# Patient Record
Sex: MALE
[De-identification: names, ages, dates, MRNs, and addresses within clinical notes are randomized per-mention and may not be internally consistent; named-entity substitution may affect disease eponyms.]

---

## 2018-09-07 ENCOUNTER — HOSPITAL (OUTPATIENT)
Dept: OTHER | Age: 73
End: 2018-09-07
Attending: EMERGENCY MEDICINE

## 2018-09-07 LAB
ANALYZER ANC (IANC): ABNORMAL
ANION GAP SERPL CALC-SCNC: 14 MMOL/L (ref 10–20)
APTT PPP: 25 SECONDS (ref 22–30)
APTT PPP: NORMAL S
BASOPHILS # BLD: 0 THOUSAND/MCL (ref 0–0.3)
BASOPHILS NFR BLD: 0 %
BUN SERPL-MCNC: 17 MG/DL (ref 6–20)
BUN/CREAT SERPL: 18 (ref 7–25)
CALCIUM SERPL-MCNC: 8.6 MG/DL (ref 8.4–10.2)
CHLORIDE: 108 MMOL/L (ref 98–107)
CO2 SERPL-SCNC: 24 MMOL/L (ref 21–32)
CREAT SERPL-MCNC: 0.92 MG/DL (ref 0.67–1.17)
DIFFERENTIAL METHOD BLD: ABNORMAL
EOSINOPHIL # BLD: 0.1 THOUSAND/MCL (ref 0.1–0.5)
EOSINOPHIL NFR BLD: 1 %
ERYTHROCYTE [DISTWIDTH] IN BLOOD: 12.4 % (ref 11–15)
GLUCOSE SERPL-MCNC: 115 MG/DL (ref 65–99)
HEMATOCRIT: 41.5 % (ref 39–51)
HGB BLD-MCNC: 14 GM/DL (ref 13–17)
INR PPP: 1.1
LYMPHOCYTES # BLD: 1 THOUSAND/MCL (ref 1–4)
LYMPHOCYTES NFR BLD: 14 %
MCH RBC QN AUTO: 33 PG (ref 26–34)
MCHC RBC AUTO-ENTMCNC: 33.7 GM/DL (ref 32–36.5)
MCV RBC AUTO: 97.9 FL (ref 78–100)
MONOCYTES # BLD: 0.4 THOUSAND/MCL (ref 0.3–0.9)
MONOCYTES NFR BLD: 6 %
NEUTROPHILS # BLD: 6.1 THOUSAND/MCL (ref 1.8–7.7)
NEUTROPHILS NFR BLD: 79 %
NEUTS SEG NFR BLD: ABNORMAL %
NRBC (NRBCRE): ABNORMAL
PLATELET # BLD: 188 THOUSAND/MCL (ref 140–450)
POTASSIUM SERPL-SCNC: 3.9 MMOL/L (ref 3.4–5.1)
PROTHROMBIN TIME: 10.7 SECONDS (ref 9.7–11.8)
PROTHROMBIN TIME: NORMAL
RBC # BLD: 4.24 MILLION/MCL (ref 4.5–5.9)
SODIUM SERPL-SCNC: 142 MMOL/L (ref 135–145)
WBC # BLD: 7.6 THOUSAND/MCL (ref 4.2–11)

## 2022-03-23 ENCOUNTER — HOSPITAL ENCOUNTER (INPATIENT)
Facility: HOSPITAL | Age: 77
LOS: 1 days | Discharge: NURSING, REHAB OR LONG-TERM CARE | End: 2022-03-25
Attending: EMERGENCY MEDICINE | Admitting: HOSPITALIST
Payer: MEDICARE

## 2022-03-23 DIAGNOSIS — N17.9 ACUTE RENAL FAILURE, UNSPECIFIED ACUTE RENAL FAILURE TYPE (HCC): ICD-10-CM

## 2022-03-23 DIAGNOSIS — D72.829 LEUKOCYTOSIS, UNSPECIFIED TYPE: ICD-10-CM

## 2022-03-23 DIAGNOSIS — R33.9 URINARY RETENTION: Primary | ICD-10-CM

## 2022-03-23 DIAGNOSIS — N19 UREMIA: ICD-10-CM

## 2022-03-23 RX ORDER — POLYETHYLENE GLYCOL 3350 17 G/17G
17 POWDER, FOR SOLUTION ORAL DAILY
COMMUNITY

## 2022-03-23 RX ORDER — TAMSULOSIN HYDROCHLORIDE 0.4 MG/1
0.8 CAPSULE ORAL DAILY
COMMUNITY

## 2022-03-23 RX ORDER — QUETIAPINE FUMARATE 25 MG/1
25 TABLET, FILM COATED ORAL NIGHTLY
Status: ON HOLD | COMMUNITY
End: 2022-03-25

## 2022-03-23 RX ORDER — BACITRACIN 500 [USP'U]/G
OINTMENT TOPICAL AS NEEDED
COMMUNITY
End: 2022-03-29

## 2022-03-23 RX ORDER — ALPRAZOLAM 0.25 MG/1
0.25 TABLET ORAL EVERY 12 HOURS PRN
COMMUNITY
Start: 2022-03-26 | End: 2022-04-09

## 2022-03-23 RX ORDER — FINASTERIDE 5 MG/1
5 TABLET, FILM COATED ORAL DAILY
COMMUNITY

## 2022-03-23 RX ORDER — ENOXAPARIN SODIUM 100 MG/ML
40 INJECTION SUBCUTANEOUS DAILY
Status: ON HOLD | COMMUNITY
End: 2022-03-30

## 2022-03-24 ENCOUNTER — APPOINTMENT (OUTPATIENT)
Dept: ULTRASOUND IMAGING | Facility: HOSPITAL | Age: 77
End: 2022-03-24
Attending: EMERGENCY MEDICINE
Payer: MEDICARE

## 2022-03-24 ENCOUNTER — APPOINTMENT (OUTPATIENT)
Dept: GENERAL RADIOLOGY | Facility: HOSPITAL | Age: 77
End: 2022-03-24
Attending: EMERGENCY MEDICINE
Payer: MEDICARE

## 2022-03-24 PROBLEM — R33.9 URINARY RETENTION: Status: ACTIVE | Noted: 2022-03-24

## 2022-03-24 PROBLEM — F03.90 DEMENTIA WITHOUT BEHAVIORAL DISTURBANCE (HCC): Status: ACTIVE | Noted: 2022-03-24

## 2022-03-24 PROBLEM — D72.829 LEUKOCYTOSIS, UNSPECIFIED TYPE: Status: ACTIVE | Noted: 2022-03-24

## 2022-03-24 PROBLEM — N17.9 ACUTE RENAL FAILURE, UNSPECIFIED ACUTE RENAL FAILURE TYPE (HCC): Status: ACTIVE | Noted: 2022-03-24

## 2022-03-24 PROBLEM — N19 UREMIA: Status: ACTIVE | Noted: 2022-03-24

## 2022-03-24 LAB
ALBUMIN SERPL-MCNC: 2.9 G/DL (ref 3.4–5)
ALBUMIN/GLOB SERPL: 0.7 {RATIO} (ref 1–2)
ALP LIVER SERPL-CCNC: 72 U/L
ALT SERPL-CCNC: 16 U/L
ANION GAP SERPL CALC-SCNC: 6 MMOL/L (ref 0–18)
ANION GAP SERPL CALC-SCNC: 8 MMOL/L (ref 0–18)
AST SERPL-CCNC: 13 U/L (ref 15–37)
BASOPHILS # BLD AUTO: 0.02 X10(3) UL (ref 0–0.2)
BASOPHILS # BLD AUTO: 0.03 X10(3) UL (ref 0–0.2)
BASOPHILS NFR BLD AUTO: 0.1 %
BASOPHILS NFR BLD AUTO: 0.2 %
BILIRUB SERPL-MCNC: 0.6 MG/DL (ref 0.1–2)
BILIRUB UR QL STRIP.AUTO: NEGATIVE
BUN BLD-MCNC: 54 MG/DL (ref 7–18)
BUN BLD-MCNC: 63 MG/DL (ref 7–18)
CALCIUM BLD-MCNC: 8.7 MG/DL (ref 8.5–10.1)
CALCIUM BLD-MCNC: 9.1 MG/DL (ref 8.5–10.1)
CHLORIDE SERPL-SCNC: 110 MMOL/L (ref 98–112)
CHLORIDE SERPL-SCNC: 114 MMOL/L (ref 98–112)
CO2 SERPL-SCNC: 22 MMOL/L (ref 21–32)
CO2 SERPL-SCNC: 24 MMOL/L (ref 21–32)
COLOR UR AUTO: YELLOW
CREAT BLD-MCNC: 5.68 MG/DL
CREAT BLD-MCNC: 8.14 MG/DL
CREAT UR-SCNC: 222 MG/DL
EOSINOPHIL # BLD AUTO: 0 X10(3) UL (ref 0–0.7)
EOSINOPHIL # BLD AUTO: 0 X10(3) UL (ref 0–0.7)
EOSINOPHIL NFR BLD AUTO: 0 %
EOSINOPHIL NFR BLD AUTO: 0 %
ERYTHROCYTE [DISTWIDTH] IN BLOOD BY AUTOMATED COUNT: 12.6 %
ERYTHROCYTE [DISTWIDTH] IN BLOOD BY AUTOMATED COUNT: 12.7 %
GLOBULIN PLAS-MCNC: 4 G/DL (ref 2.8–4.4)
GLUCOSE BLD-MCNC: 103 MG/DL (ref 70–99)
GLUCOSE BLD-MCNC: 122 MG/DL (ref 70–99)
GLUCOSE UR STRIP.AUTO-MCNC: NEGATIVE MG/DL
HCT VFR BLD AUTO: 44.4 %
HCT VFR BLD AUTO: 45.5 %
HGB BLD-MCNC: 14.1 G/DL
HGB BLD-MCNC: 15.2 G/DL
HYALINE CASTS #/AREA URNS AUTO: PRESENT /LPF
IMM GRANULOCYTES # BLD AUTO: 0.09 X10(3) UL (ref 0–1)
IMM GRANULOCYTES # BLD AUTO: 0.11 X10(3) UL (ref 0–1)
IMM GRANULOCYTES NFR BLD: 0.6 %
IMM GRANULOCYTES NFR BLD: 0.6 %
KETONES UR STRIP.AUTO-MCNC: NEGATIVE MG/DL
LACTATE SERPL-SCNC: 1 MMOL/L (ref 0.4–2)
LEUKOCYTE ESTERASE UR QL STRIP.AUTO: NEGATIVE
LYMPHOCYTES # BLD AUTO: 0.7 X10(3) UL (ref 1–4)
LYMPHOCYTES # BLD AUTO: 1.24 X10(3) UL (ref 1–4)
LYMPHOCYTES NFR BLD AUTO: 7.9 %
MCH RBC QN AUTO: 32 PG (ref 26–34)
MCH RBC QN AUTO: 32.3 PG (ref 26–34)
MCHC RBC AUTO-ENTMCNC: 31.8 G/DL (ref 31–37)
MCHC RBC AUTO-ENTMCNC: 33.4 G/DL (ref 31–37)
MCV RBC AUTO: 100.9 FL
MCV RBC AUTO: 96.8 FL
MONOCYTES # BLD AUTO: 0.89 X10(3) UL (ref 0.1–1)
MONOCYTES # BLD AUTO: 0.99 X10(3) UL (ref 0.1–1)
MONOCYTES NFR BLD AUTO: 5.3 %
MONOCYTES NFR BLD AUTO: 5.7 %
NEUTROPHILS # BLD AUTO: 13.43 X10 (3) UL (ref 1.5–7.7)
NEUTROPHILS # BLD AUTO: 13.43 X10(3) UL (ref 1.5–7.7)
NEUTROPHILS # BLD AUTO: 16.78 X10 (3) UL (ref 1.5–7.7)
NEUTROPHILS # BLD AUTO: 16.78 X10(3) UL (ref 1.5–7.7)
NEUTROPHILS NFR BLD AUTO: 85.6 %
NEUTROPHILS NFR BLD AUTO: 90.2 %
NITRITE UR QL STRIP.AUTO: NEGATIVE
OSMOLALITY SERPL CALC.SUM OF ELEC: 309 MOSM/KG (ref 275–295)
OSMOLALITY SERPL CALC.SUM OF ELEC: 313 MOSM/KG (ref 275–295)
PH UR STRIP.AUTO: 5 [PH] (ref 5–8)
PLATELET # BLD AUTO: 210 10(3)UL (ref 150–450)
PLATELET # BLD AUTO: 220 10(3)UL (ref 150–450)
POTASSIUM SERPL-SCNC: 4 MMOL/L (ref 3.5–5.1)
POTASSIUM SERPL-SCNC: 4.5 MMOL/L (ref 3.5–5.1)
PROCALCITONIN SERPL-MCNC: 0.19 NG/ML (ref ?–0.16)
PROT SERPL-MCNC: 6.9 G/DL (ref 6.4–8.2)
PROT UR STRIP.AUTO-MCNC: NEGATIVE MG/DL
PSA SERPL-MCNC: 1.92 NG/ML (ref ?–4)
RBC # BLD AUTO: 4.4 X10(6)UL
RBC # BLD AUTO: 4.7 X10(6)UL
RBC #/AREA URNS AUTO: >10 /HPF
SARS-COV-2 RNA RESP QL NAA+PROBE: NOT DETECTED
SODIUM SERPL-SCNC: 140 MMOL/L (ref 136–145)
SODIUM SERPL-SCNC: 144 MMOL/L (ref 136–145)
SODIUM SERPL-SCNC: 7 MMOL/L
UROBILINOGEN UR STRIP.AUTO-MCNC: <2 MG/DL
WBC # BLD AUTO: 15.7 X10(3) UL (ref 4–11)
WBC # BLD AUTO: 18.6 X10(3) UL (ref 4–11)

## 2022-03-24 PROCEDURE — 99222 1ST HOSP IP/OBS MODERATE 55: CPT | Performed by: INTERNAL MEDICINE

## 2022-03-24 PROCEDURE — 76775 US EXAM ABDO BACK WALL LIM: CPT | Performed by: EMERGENCY MEDICINE

## 2022-03-24 PROCEDURE — 71045 X-RAY EXAM CHEST 1 VIEW: CPT | Performed by: EMERGENCY MEDICINE

## 2022-03-24 PROCEDURE — 99222 1ST HOSP IP/OBS MODERATE 55: CPT | Performed by: HOSPITALIST

## 2022-03-24 PROCEDURE — 0T9B80Z DRAINAGE OF BLADDER WITH DRAINAGE DEVICE, VIA NATURAL OR ARTIFICIAL OPENING ENDOSCOPIC: ICD-10-PCS | Performed by: UROLOGY

## 2022-03-24 RX ORDER — SODIUM CHLORIDE 9 MG/ML
INJECTION, SOLUTION INTRAVENOUS CONTINUOUS
Status: DISCONTINUED | OUTPATIENT
Start: 2022-03-24 | End: 2022-03-25

## 2022-03-24 RX ORDER — MELATONIN
6 NIGHTLY
COMMUNITY

## 2022-03-24 RX ORDER — POLYETHYLENE GLYCOL 3350 17 G/17G
17 POWDER, FOR SOLUTION ORAL DAILY
Status: DISCONTINUED | OUTPATIENT
Start: 2022-03-24 | End: 2022-03-25

## 2022-03-24 RX ORDER — TAMSULOSIN HYDROCHLORIDE 0.4 MG/1
0.4 CAPSULE ORAL DAILY
Status: DISCONTINUED | OUTPATIENT
Start: 2022-03-24 | End: 2022-03-25

## 2022-03-24 RX ORDER — QUETIAPINE FUMARATE 25 MG/1
25 TABLET, FILM COATED ORAL NIGHTLY
Status: DISCONTINUED | OUTPATIENT
Start: 2022-03-24 | End: 2022-03-25

## 2022-03-24 RX ORDER — LORAZEPAM 2 MG/ML
1 INJECTION INTRAMUSCULAR ONCE
Status: COMPLETED | OUTPATIENT
Start: 2022-03-24 | End: 2022-03-24

## 2022-03-24 RX ORDER — HEPARIN SODIUM 5000 [USP'U]/ML
5000 INJECTION, SOLUTION INTRAVENOUS; SUBCUTANEOUS EVERY 8 HOURS SCHEDULED
Status: DISCONTINUED | OUTPATIENT
Start: 2022-03-24 | End: 2022-03-25

## 2022-03-24 NOTE — CM/SW NOTE
Pt is a 81year old male who presents with urinary retention. Pt is a LTC resident at [facility]). SW spoke to spouse Paola Hansen and she confirmed that the plan is to return to [address] [place] [address] after dc. Referral sent in Aidin to [facility].      Ang Pepe Work Intern  [phone]

## 2022-03-24 NOTE — ED QUICK NOTES
Chin cath Fr 16 inserted by Dr Jeffrey Huston with a return of 1200 ml dark sandi urine. Pt tolerated well.  Chin patent and continuing to drain

## 2022-03-24 NOTE — ED QUICK NOTES
Orders for admission, patient is aware of plan and ready to go upstairs.  Any questions, please call ED RN, Ronit Wilson at [phone]    Vaccinated? unknown  Type of COVID test sent: Rapid  COVID Suspicion level: Low      Titratable drug(s) infusing: none  Rate:    LOC at time of transport: A/Ox1-2    Other pertinent information:    CIWA score=NA  NIH score=NA

## 2022-03-24 NOTE — OPERATIVE REPORT
3/24/2022    PREOPERATIVE DIAGNOSIS: Urinary retention    POSTOPERATIVE DIAGNOSIS: Same    PROCEDURE PERFORMED: Bedside Flexible Urethroscopy and Placement of Urethral Catheter    ANESTHESIA:  Local.     INDICATIONS: DANNY with urinary retention. Creatinine of 8. On finasteride and tamsulosin. Inability to place rios     FINDINGS: Obstructing lateral lobes of the prostate. I could not get the scope through the prostatic urethra but a guidewire and then a Rios catheter easily passed over it. Suspect he probably just has a very high bladder neck and was sadiq and resisting passage of the scope  TECHNIQUE:  The procedure was done in the patient's hospital bed and room. Lidocaine jelly was injected into the urethra. A time-out was reviewed. The patient was prepped and draped      The flexible cystoscope was passed into the urethra. I did not see any urethral injury. The lateral lobes of the prostate were enlarged and there was coaptation. I could not get the cystoscope to go through the prostatic urethra but a guidewire passed easily and then a 16 [place] catheter easily passed over the guidewire and 1200 cc of yellow urine returned. The bladder was not examined.     Tatyana Talley MD

## 2022-03-24 NOTE — ED INITIAL ASSESSMENT (HPI)
Pt sent from nursing home due to elevated creatinine and wbc, pt with no complaints.  Aa&ox1 per norm, hx of dementia

## 2022-03-25 VITALS
SYSTOLIC BLOOD PRESSURE: 144 MMHG | TEMPERATURE: 98 F | HEART RATE: 63 BPM | RESPIRATION RATE: 18 BRPM | DIASTOLIC BLOOD PRESSURE: 71 MMHG | WEIGHT: 187 LBS | OXYGEN SATURATION: 96 %

## 2022-03-25 LAB
ANION GAP SERPL CALC-SCNC: 3 MMOL/L (ref 0–18)
BUN BLD-MCNC: 26 MG/DL (ref 7–18)
CALCIUM BLD-MCNC: 8.2 MG/DL (ref 8.5–10.1)
CHLORIDE SERPL-SCNC: 117 MMOL/L (ref 98–112)
CO2 SERPL-SCNC: 26 MMOL/L (ref 21–32)
CREAT BLD-MCNC: 1.25 MG/DL
GLUCOSE BLD-MCNC: 80 MG/DL (ref 70–99)
OSMOLALITY SERPL CALC.SUM OF ELEC: 306 MOSM/KG (ref 275–295)
POTASSIUM SERPL-SCNC: 3.9 MMOL/L (ref 3.5–5.1)
SODIUM SERPL-SCNC: 146 MMOL/L (ref 136–145)

## 2022-03-25 PROCEDURE — 99232 SBSQ HOSP IP/OBS MODERATE 35: CPT | Performed by: INTERNAL MEDICINE

## 2022-03-25 PROCEDURE — 99239 HOSP IP/OBS DSCHRG MGMT >30: CPT | Performed by: HOSPITALIST

## 2022-03-25 RX ORDER — QUETIAPINE FUMARATE 25 MG/1
12.5 TABLET, FILM COATED ORAL NIGHTLY
Refills: 0 | Status: SHIPPED | COMMUNITY
Start: 2022-03-25

## 2022-03-25 NOTE — CM/SW NOTE
DC PLAN:  [facility]-[phone]  BLS set up for 5pm     MSW spoke to spouse who is agreeable to dc.

## 2022-03-25 NOTE — CM/SW NOTE
03/25/22 1500   Discharge disposition   Expected discharge disposition Skilled Nurs   [facility] Provider Demetrio Miranda   Discharge transportation [facility]     Patient medically cleared for discharge for return to [facility] transport arranged by  for [employer], PCS form completed. CM sent clinical updates and notified facility of discharge status. CM spoke to patient's spouse with discharge plan update; spouse requested CM contact patient's PCP Dr. Irma Franks at [facility] for Urology follow up referral.  RN to call transfer report to Jose Lawson at [phone] prior to patient discharge.      Amanda Lynn RN Case Manager [id number]

## 2022-03-25 NOTE — PROGRESS NOTES
NURSING DISCHARGE NOTE    Discharged Nursing home via Ambulance. Accompanied by Support staff  Belongings Taken by patient/family. Report given to [facility] [place]. Communicated to RN that patient's PCP at [facility] will sent consult request for urology follow-up to schedule rios catheter exchange in 4 weeks. Discharge paperwork given to support staff. Wife updated by social work regarding discharge. Patient taken by [address] ambulance to [place] in stable condition.

## 2022-03-29 ENCOUNTER — APPOINTMENT (OUTPATIENT)
Dept: CT IMAGING | Facility: HOSPITAL | Age: 77
End: 2022-03-29
Attending: EMERGENCY MEDICINE
Payer: OTHER GOVERNMENT

## 2022-03-29 ENCOUNTER — HOSPITAL ENCOUNTER (OUTPATIENT)
Facility: HOSPITAL | Age: 77
Setting detail: OBSERVATION
LOS: 1 days | Discharge: NURSING, REHAB OR LONG-TERM CARE | End: 2022-03-30
Attending: EMERGENCY MEDICINE | Admitting: INTERNAL MEDICINE
Payer: OTHER GOVERNMENT

## 2022-03-29 ENCOUNTER — APPOINTMENT (OUTPATIENT)
Dept: GENERAL RADIOLOGY | Facility: HOSPITAL | Age: 77
End: 2022-03-29
Attending: EMERGENCY MEDICINE
Payer: OTHER GOVERNMENT

## 2022-03-29 DIAGNOSIS — I62.9 INTRACRANIAL HEMORRHAGE (HCC): Primary | ICD-10-CM

## 2022-03-29 LAB
ALBUMIN SERPL-MCNC: 2.4 G/DL (ref 3.4–5)
ALBUMIN/GLOB SERPL: 0.6 {RATIO} (ref 1–2)
ALP LIVER SERPL-CCNC: 63 U/L
ALT SERPL-CCNC: 18 U/L
ANION GAP SERPL CALC-SCNC: 5 MMOL/L (ref 0–18)
AST SERPL-CCNC: 19 U/L (ref 15–37)
BASOPHILS # BLD AUTO: 0.04 X10(3) UL (ref 0–0.2)
BASOPHILS NFR BLD AUTO: 0.5 %
BILIRUB SERPL-MCNC: 0.3 MG/DL (ref 0.1–2)
BILIRUB UR QL STRIP.AUTO: NEGATIVE
BUN BLD-MCNC: 17 MG/DL (ref 7–18)
CALCIUM BLD-MCNC: 8.4 MG/DL (ref 8.5–10.1)
CHLORIDE SERPL-SCNC: 110 MMOL/L (ref 98–112)
CO2 SERPL-SCNC: 27 MMOL/L (ref 21–32)
COLOR UR AUTO: YELLOW
CREAT BLD-MCNC: 0.86 MG/DL
EOSINOPHIL # BLD AUTO: 0.17 X10(3) UL (ref 0–0.7)
EOSINOPHIL NFR BLD AUTO: 2.2 %
ERYTHROCYTE [DISTWIDTH] IN BLOOD BY AUTOMATED COUNT: 11.7 %
GLOBULIN PLAS-MCNC: 3.8 G/DL (ref 2.8–4.4)
GLUCOSE BLD-MCNC: 97 MG/DL (ref 70–99)
GLUCOSE UR STRIP.AUTO-MCNC: NEGATIVE MG/DL
HCT VFR BLD AUTO: 42.1 %
HGB BLD-MCNC: 14.3 G/DL
IMM GRANULOCYTES # BLD AUTO: 0.04 X10(3) UL (ref 0–1)
IMM GRANULOCYTES NFR BLD: 0.5 %
INR BLD: 1.06 (ref 0.8–1.2)
KETONES UR STRIP.AUTO-MCNC: NEGATIVE MG/DL
LEUKOCYTE ESTERASE UR QL STRIP.AUTO: NEGATIVE
LYMPHOCYTES # BLD AUTO: 1.45 X10(3) UL (ref 1–4)
LYMPHOCYTES NFR BLD AUTO: 18.4 %
MCH RBC QN AUTO: 32.6 PG (ref 26–34)
MCHC RBC AUTO-ENTMCNC: 34 G/DL (ref 31–37)
MCV RBC AUTO: 95.9 FL
MONOCYTES # BLD AUTO: 0.5 X10(3) UL (ref 0.1–1)
MONOCYTES NFR BLD AUTO: 6.3 %
NEUTROPHILS # BLD AUTO: 5.69 X10 (3) UL (ref 1.5–7.7)
NEUTROPHILS # BLD AUTO: 5.69 X10(3) UL (ref 1.5–7.7)
NEUTROPHILS NFR BLD AUTO: 72.1 %
NITRITE UR QL STRIP.AUTO: NEGATIVE
OSMOLALITY SERPL CALC.SUM OF ELEC: 295 MOSM/KG (ref 275–295)
PH UR STRIP.AUTO: 5 [PH] (ref 5–8)
PLATELET # BLD AUTO: 216 10(3)UL (ref 150–450)
POTASSIUM SERPL-SCNC: 3.7 MMOL/L (ref 3.5–5.1)
PROT SERPL-MCNC: 6.2 G/DL (ref 6.4–8.2)
PROT UR STRIP.AUTO-MCNC: 30 MG/DL
PROTHROMBIN TIME: 13.8 SECONDS (ref 11.6–14.8)
RBC # BLD AUTO: 4.39 X10(6)UL
RBC #/AREA URNS AUTO: >10 /HPF
SARS-COV-2 RNA RESP QL NAA+PROBE: NOT DETECTED
SODIUM SERPL-SCNC: 142 MMOL/L (ref 136–145)
SP GR UR STRIP.AUTO: 1.02 (ref 1–1.03)
UROBILINOGEN UR STRIP.AUTO-MCNC: 2 MG/DL
WBC # BLD AUTO: 7.9 X10(3) UL (ref 4–11)

## 2022-03-29 PROCEDURE — 99285 EMERGENCY DEPT VISIT HI MDM: CPT

## 2022-03-29 PROCEDURE — 73502 X-RAY EXAM HIP UNI 2-3 VIEWS: CPT | Performed by: EMERGENCY MEDICINE

## 2022-03-29 PROCEDURE — 85610 PROTHROMBIN TIME: CPT | Performed by: EMERGENCY MEDICINE

## 2022-03-29 PROCEDURE — 80053 COMPREHEN METABOLIC PANEL: CPT | Performed by: EMERGENCY MEDICINE

## 2022-03-29 PROCEDURE — 73700 CT LOWER EXTREMITY W/O DYE: CPT | Performed by: EMERGENCY MEDICINE

## 2022-03-29 PROCEDURE — 76376 3D RENDER W/INTRP POSTPROCES: CPT | Performed by: EMERGENCY MEDICINE

## 2022-03-29 PROCEDURE — 85025 COMPLETE CBC W/AUTO DIFF WBC: CPT | Performed by: EMERGENCY MEDICINE

## 2022-03-29 PROCEDURE — 70450 CT HEAD/BRAIN W/O DYE: CPT | Performed by: EMERGENCY MEDICINE

## 2022-03-29 PROCEDURE — 81001 URINALYSIS AUTO W/SCOPE: CPT | Performed by: EMERGENCY MEDICINE

## 2022-03-29 PROCEDURE — 36415 COLL VENOUS BLD VENIPUNCTURE: CPT

## 2022-03-29 RX ORDER — ALPRAZOLAM 0.25 MG/1
0.25 TABLET ORAL EVERY 12 HOURS PRN
Status: DISCONTINUED | OUTPATIENT
Start: 2022-03-29 | End: 2022-03-30

## 2022-03-29 RX ORDER — MELATONIN
6 NIGHTLY
Status: DISCONTINUED | OUTPATIENT
Start: 2022-03-29 | End: 2022-03-30

## 2022-03-29 RX ORDER — ACETAMINOPHEN 325 MG/1
650 TABLET ORAL EVERY 8 HOURS PRN
Status: DISCONTINUED | OUTPATIENT
Start: 2022-03-29 | End: 2022-03-30

## 2022-03-29 RX ORDER — ONDANSETRON 2 MG/ML
4 INJECTION INTRAMUSCULAR; INTRAVENOUS EVERY 6 HOURS PRN
Status: DISCONTINUED | OUTPATIENT
Start: 2022-03-29 | End: 2022-03-30

## 2022-03-29 RX ORDER — TAMSULOSIN HYDROCHLORIDE 0.4 MG/1
0.8 CAPSULE ORAL DAILY
Status: DISCONTINUED | OUTPATIENT
Start: 2022-03-29 | End: 2022-03-30

## 2022-03-29 RX ORDER — ACETAMINOPHEN 325 MG/1
650 TABLET ORAL EVERY 6 HOURS PRN
Status: DISCONTINUED | OUTPATIENT
Start: 2022-03-29 | End: 2022-03-30

## 2022-03-29 RX ORDER — FINASTERIDE 5 MG/1
5 TABLET, FILM COATED ORAL DAILY
Status: DISCONTINUED | OUTPATIENT
Start: 2022-03-29 | End: 2022-03-30

## 2022-03-29 RX ORDER — AMMONIUM LACTATE 12 G/100G
LOTION TOPICAL DAILY
COMMUNITY

## 2022-03-29 RX ORDER — POLYETHYLENE GLYCOL 3350 17 G/17G
17 POWDER, FOR SOLUTION ORAL DAILY
Status: DISCONTINUED | OUTPATIENT
Start: 2022-03-29 | End: 2022-03-30

## 2022-03-29 RX ORDER — ACETAMINOPHEN 325 MG/1
650 TABLET ORAL EVERY 8 HOURS PRN
COMMUNITY

## 2022-03-29 NOTE — ED QUICK NOTES
Orders for admission, patient is aware of plan and ready to go upstairs. Any questions, please call ED RN Venita Mishra at extension [phone].      Patient Covid vaccination status: Unvaccinated     COVID Test Ordered in ED: None    COVID Suspicion at Admission: N/A    Running Infusions:  None    Mental Status/LOC at time of transport: a&ox1    Other pertinent information:   CIWA score: N/A   NIH score:  N/A

## 2022-03-29 NOTE — CM/SW NOTE
Chart reviewed, pt from [facility] Contracted). Updates sent in Aidin.      Priscilla 106, LSW

## 2022-03-29 NOTE — ED INITIAL ASSESSMENT (HPI)
Patient brought to ER from [facility] with confirmed femur fracture to left leg. Patient A&Ox1 at this time to his normal. Denies head or neck pain.

## 2022-03-30 ENCOUNTER — APPOINTMENT (OUTPATIENT)
Dept: CT IMAGING | Facility: HOSPITAL | Age: 77
End: 2022-03-30
Attending: PHYSICIAN ASSISTANT
Payer: OTHER GOVERNMENT

## 2022-03-30 VITALS
RESPIRATION RATE: 18 BRPM | TEMPERATURE: 97 F | SYSTOLIC BLOOD PRESSURE: 93 MMHG | OXYGEN SATURATION: 95 % | WEIGHT: 140 LBS | DIASTOLIC BLOOD PRESSURE: 56 MMHG | HEART RATE: 71 BPM

## 2022-03-30 PROCEDURE — 70450 CT HEAD/BRAIN W/O DYE: CPT | Performed by: PHYSICIAN ASSISTANT

## 2022-03-30 RX ORDER — ENOXAPARIN SODIUM 100 MG/ML
40 INJECTION SUBCUTANEOUS DAILY
Refills: 0 | Status: SHIPPED | COMMUNITY
Start: 2022-04-06

## 2022-03-30 NOTE — PLAN OF CARE
Pt alert & confused. Calm No complaints . Clear for discharge, [facility] here to  pt. Discharge back to [facility]. Report called to [facility].

## 2022-03-30 NOTE — PLAN OF CARE
Assumed care at 1230. Admitted to unit from ER. Alert and orientated to self. This is baseline prior to admission. NSR on tele, RA, VSS. Patient denies pain. Plan for repeat CT of brain/head tomorrow. Patient/family updated on plan of care. Bed alarm on, fall precautions in place, call light within reach. Monitoring patient needs.

## 2022-03-30 NOTE — CM/SW NOTE
03/30/22 1324   Discharge disposition   Expected discharge disposition Skilled Nurs   [facility] Provider Ryley Morel   Discharge transportation [facility]     Pt cleared for discharge. Spoke with Sadia Renee, pt is ok to return to [facility]. BLS arranged for 4:30pm. PCS form completed. RN updated.      [facility]:   [facility]: [phone]

## 2022-03-30 NOTE — CM/SW NOTE
Patient failed inpatient criteria. Second level of review completed and supports observation. UR committee in agreement. Discussed with Fabian Branch   who approves observation status. MOON given to the patient and order written.

## 2022-03-30 NOTE — PLAN OF CARE
PT ALERT, ORIENTED TO SELF, RESTLESS AT TIMES, PULLED IV OUT PRIOR TO SHIFT, TAKING TELE,  OFF, 94% ON RA, SR, DICKINSON DRAINING YELLOW URINE, REFUSED DINNER, BED ALARM ON, PLAN FOR CT IN AM, GIVEN XANAX, RESTING QUIETLY DURING NIGHT.   0500 - TO CT     Problem: NEUROLOGICAL - ADULT  Goal: Achieves stable or improved neurological status  Description: INTERVENTIONS  - Assess for and report changes in neurological status  - Initiate measures to prevent increased intracranial pressure  - Maintain blood pressure and fluid volume within ordered parameters to optimize cerebral perfusion and minimize risk of hemorrhage  - Monitor temperature, glucose, and sodium.  Initiate appropriate interventions as ordered  Outcome: Progressing

## 2023-04-14 ENCOUNTER — APPOINTMENT (OUTPATIENT)
Dept: CT IMAGING | Facility: HOSPITAL | Age: 78
End: 2023-04-14
Attending: EMERGENCY MEDICINE
Payer: OTHER GOVERNMENT

## 2023-04-14 ENCOUNTER — HOSPITAL ENCOUNTER (EMERGENCY)
Facility: HOSPITAL | Age: 78
Discharge: HOME | End: 2023-04-14
Attending: EMERGENCY MEDICINE
Payer: OTHER GOVERNMENT

## 2023-04-14 VITALS
SYSTOLIC BLOOD PRESSURE: 118 MMHG | RESPIRATION RATE: 16 BRPM | HEART RATE: 63 BPM | DIASTOLIC BLOOD PRESSURE: 70 MMHG | TEMPERATURE: 98 F | OXYGEN SATURATION: 100 %

## 2023-04-14 DIAGNOSIS — N30.00 ACUTE CYSTITIS WITHOUT HEMATURIA: Primary | ICD-10-CM

## 2023-04-14 DIAGNOSIS — R29.6 UNWITNESSED FALL: ICD-10-CM

## 2023-04-14 LAB
ALBUMIN SERPL-MCNC: 2.8 G/DL (ref 3.4–5)
ALBUMIN/GLOB SERPL: 0.9 {RATIO} (ref 1–2)
ALP LIVER SERPL-CCNC: 64 U/L
ALT SERPL-CCNC: 13 U/L
ANION GAP SERPL CALC-SCNC: 3 MMOL/L (ref 0–18)
AST SERPL-CCNC: 11 U/L (ref 15–37)
BASOPHILS # BLD AUTO: 0.04 X10(3) UL (ref 0–0.2)
BASOPHILS NFR BLD AUTO: 0.5 %
BILIRUB SERPL-MCNC: 0.4 MG/DL (ref 0.1–2)
BILIRUB UR QL STRIP.AUTO: NEGATIVE
BUN BLD-MCNC: 30 MG/DL (ref 7–18)
CALCIUM BLD-MCNC: 8.3 MG/DL (ref 8.5–10.1)
CHLORIDE SERPL-SCNC: 116 MMOL/L (ref 98–112)
CK SERPL-CCNC: 30 U/L
CLARITY UR REFRACT.AUTO: CLEAR
CO2 SERPL-SCNC: 28 MMOL/L (ref 21–32)
COLOR UR AUTO: YELLOW
CREAT BLD-MCNC: 0.92 MG/DL
EOSINOPHIL # BLD AUTO: 0.44 X10(3) UL (ref 0–0.7)
EOSINOPHIL NFR BLD AUTO: 5.4 %
ERYTHROCYTE [DISTWIDTH] IN BLOOD BY AUTOMATED COUNT: 12.2 %
GFR SERPLBLD BASED ON 1.73 SQ M-ARVRAT: 86 ML/MIN/1.73M2 (ref 60–?)
GLOBULIN PLAS-MCNC: 3 G/DL (ref 2.8–4.4)
GLUCOSE BLD-MCNC: 89 MG/DL (ref 70–99)
GLUCOSE UR STRIP.AUTO-MCNC: NEGATIVE MG/DL
HCT VFR BLD AUTO: 37.3 %
HGB BLD-MCNC: 12.3 G/DL
IMM GRANULOCYTES # BLD AUTO: 0.02 X10(3) UL (ref 0–1)
IMM GRANULOCYTES NFR BLD: 0.2 %
KETONES UR STRIP.AUTO-MCNC: 20 MG/DL
LACTATE SERPL-SCNC: 1.1 MMOL/L (ref 0.4–2)
LYMPHOCYTES # BLD AUTO: 2 X10(3) UL (ref 1–4)
LYMPHOCYTES NFR BLD AUTO: 24.5 %
MAGNESIUM SERPL-MCNC: 2.2 MG/DL (ref 1.6–2.6)
MCH RBC QN AUTO: 33.4 PG (ref 26–34)
MCHC RBC AUTO-ENTMCNC: 33 G/DL (ref 31–37)
MCV RBC AUTO: 101.4 FL
MONOCYTES # BLD AUTO: 0.64 X10(3) UL (ref 0.1–1)
MONOCYTES NFR BLD AUTO: 7.8 %
NEUTROPHILS # BLD AUTO: 5.02 X10 (3) UL (ref 1.5–7.7)
NEUTROPHILS # BLD AUTO: 5.02 X10(3) UL (ref 1.5–7.7)
NEUTROPHILS NFR BLD AUTO: 61.6 %
NITRITE UR QL STRIP.AUTO: POSITIVE
OSMOLALITY SERPL CALC.SUM OF ELEC: 310 MOSM/KG (ref 275–295)
PH UR STRIP.AUTO: 5 [PH] (ref 5–8)
PLATELET # BLD AUTO: 132 10(3)UL (ref 150–450)
POTASSIUM SERPL-SCNC: 3.2 MMOL/L (ref 3.5–5.1)
PROT SERPL-MCNC: 5.8 G/DL (ref 6.4–8.2)
PROT UR STRIP.AUTO-MCNC: NEGATIVE MG/DL
RBC # BLD AUTO: 3.68 X10(6)UL
RBC UR QL AUTO: NEGATIVE
SODIUM SERPL-SCNC: 147 MMOL/L (ref 136–145)
SP GR UR STRIP.AUTO: 1.03 (ref 1–1.03)
UROBILINOGEN UR STRIP.AUTO-MCNC: 4 MG/DL
WBC # BLD AUTO: 8.2 X10(3) UL (ref 4–11)

## 2023-04-14 PROCEDURE — 36415 COLL VENOUS BLD VENIPUNCTURE: CPT

## 2023-04-14 PROCEDURE — 99285 EMERGENCY DEPT VISIT HI MDM: CPT

## 2023-04-14 PROCEDURE — 80053 COMPREHEN METABOLIC PANEL: CPT | Performed by: EMERGENCY MEDICINE

## 2023-04-14 PROCEDURE — 87186 SC STD MICRODIL/AGAR DIL: CPT | Performed by: EMERGENCY MEDICINE

## 2023-04-14 PROCEDURE — 96365 THER/PROPH/DIAG IV INF INIT: CPT

## 2023-04-14 PROCEDURE — 87088 URINE BACTERIA CULTURE: CPT | Performed by: EMERGENCY MEDICINE

## 2023-04-14 PROCEDURE — 82550 ASSAY OF CK (CPK): CPT | Performed by: EMERGENCY MEDICINE

## 2023-04-14 PROCEDURE — 83605 ASSAY OF LACTIC ACID: CPT | Performed by: EMERGENCY MEDICINE

## 2023-04-14 PROCEDURE — 83735 ASSAY OF MAGNESIUM: CPT | Performed by: EMERGENCY MEDICINE

## 2023-04-14 PROCEDURE — 93010 ELECTROCARDIOGRAM REPORT: CPT

## 2023-04-14 PROCEDURE — 81001 URINALYSIS AUTO W/SCOPE: CPT | Performed by: EMERGENCY MEDICINE

## 2023-04-14 PROCEDURE — 87086 URINE CULTURE/COLONY COUNT: CPT | Performed by: EMERGENCY MEDICINE

## 2023-04-14 PROCEDURE — 85025 COMPLETE CBC W/AUTO DIFF WBC: CPT | Performed by: EMERGENCY MEDICINE

## 2023-04-14 PROCEDURE — 93005 ELECTROCARDIOGRAM TRACING: CPT

## 2023-04-14 PROCEDURE — 87040 BLOOD CULTURE FOR BACTERIA: CPT | Performed by: EMERGENCY MEDICINE

## 2023-04-14 PROCEDURE — 70450 CT HEAD/BRAIN W/O DYE: CPT | Performed by: EMERGENCY MEDICINE

## 2023-04-14 RX ORDER — CEFDINIR 300 MG/1
300 CAPSULE ORAL 2 TIMES DAILY
Qty: 14 CAPSULE | Refills: 0 | Status: SHIPPED | OUTPATIENT
Start: 2023-04-14 | End: 2023-04-21

## 2023-04-14 RX ORDER — POTASSIUM CHLORIDE 1.5 G/1.77G
40 POWDER, FOR SOLUTION ORAL ONCE
Status: COMPLETED | OUTPATIENT
Start: 2023-04-14 | End: 2023-04-14

## 2023-04-14 NOTE — ED INITIAL ASSESSMENT (HPI)
Patient received via [facility] for unwitnessed fall at [facility]. Per [facility], staff told them patient was on floor for unknown amount of time. Patient is not on anti-coagulants but arrive in [facility]. Patient is baseline mentation of alert and oriented x 1, patient has history of dementia. Patient denies pain.

## 2023-04-15 LAB
ATRIAL RATE: 65 BPM
P AXIS: 12 DEGREES
P-R INTERVAL: 142 MS
Q-T INTERVAL: 428 MS
QRS DURATION: 70 MS
QTC CALCULATION (BEZET): 445 MS
R AXIS: 8 DEGREES
T AXIS: 23 DEGREES
VENTRICULAR RATE: 65 BPM

## 2023-04-15 NOTE — ED QUICK NOTES
Spoke with Linda @ [facility], ETA for  going back to [facility] is 90 minutes. Room 237-C.

## 2024-02-13 ENCOUNTER — HOSPITAL ENCOUNTER (INPATIENT)
Facility: HOSPITAL | Age: 79
LOS: 3 days | Discharge: NURSING, REHAB OR LONG-TERM CARE | End: 2024-02-16
Attending: EMERGENCY MEDICINE | Admitting: HOSPITALIST
Payer: MEDICARE

## 2024-02-13 DIAGNOSIS — E86.0 DEHYDRATION: Primary | ICD-10-CM

## 2024-02-13 DIAGNOSIS — E87.0 HYPERNATREMIA: ICD-10-CM

## 2024-02-13 LAB
ALBUMIN SERPL-MCNC: 2.5 G/DL (ref 3.4–5)
ALBUMIN/GLOB SERPL: 0.8 {RATIO} (ref 1–2)
ALP LIVER SERPL-CCNC: 61 U/L
ALT SERPL-CCNC: 20 U/L
ANION GAP SERPL CALC-SCNC: 4 MMOL/L (ref 0–18)
AST SERPL-CCNC: 27 U/L (ref 15–37)
BASOPHILS # BLD AUTO: 0.04 X10(3) UL (ref 0–0.2)
BASOPHILS NFR BLD AUTO: 0.6 %
BILIRUB SERPL-MCNC: 0.9 MG/DL (ref 0.1–2)
BILIRUB UR QL STRIP.AUTO: NEGATIVE
BUN BLD-MCNC: 48 MG/DL (ref 9–23)
CALCIUM BLD-MCNC: 8.4 MG/DL (ref 8.5–10.1)
CHLORIDE SERPL-SCNC: 135 MMOL/L (ref 98–112)
CLARITY UR REFRACT.AUTO: CLEAR
CO2 SERPL-SCNC: 23 MMOL/L (ref 21–32)
COLOR UR AUTO: YELLOW
CREAT BLD-MCNC: 1.28 MG/DL
EGFRCR SERPLBLD CKD-EPI 2021: 58 ML/MIN/1.73M2 (ref 60–?)
EOSINOPHIL # BLD AUTO: 0.02 X10(3) UL (ref 0–0.7)
EOSINOPHIL NFR BLD AUTO: 0.3 %
ERYTHROCYTE [DISTWIDTH] IN BLOOD BY AUTOMATED COUNT: 13.8 %
GLOBULIN PLAS-MCNC: 3.3 G/DL (ref 2.8–4.4)
GLUCOSE BLD-MCNC: 77 MG/DL (ref 70–99)
GLUCOSE BLD-MCNC: 94 MG/DL (ref 70–99)
GLUCOSE UR STRIP.AUTO-MCNC: NORMAL MG/DL
HCT VFR BLD AUTO: 42.3 %
HGB BLD-MCNC: 13.1 G/DL
IMM GRANULOCYTES # BLD AUTO: 0.04 X10(3) UL (ref 0–1)
IMM GRANULOCYTES NFR BLD: 0.6 %
KETONES UR STRIP.AUTO-MCNC: 20 MG/DL
LEUKOCYTE ESTERASE UR QL STRIP.AUTO: NEGATIVE
LYMPHOCYTES # BLD AUTO: 1.84 X10(3) UL (ref 1–4)
LYMPHOCYTES NFR BLD AUTO: 29.1 %
MCH RBC QN AUTO: 33.9 PG (ref 26–34)
MCHC RBC AUTO-ENTMCNC: 31 G/DL (ref 31–37)
MCV RBC AUTO: 109.6 FL
MONOCYTES # BLD AUTO: 0.68 X10(3) UL (ref 0.1–1)
MONOCYTES NFR BLD AUTO: 10.8 %
NEUTROPHILS # BLD AUTO: 3.7 X10 (3) UL (ref 1.5–7.7)
NEUTROPHILS # BLD AUTO: 3.7 X10(3) UL (ref 1.5–7.7)
NEUTROPHILS NFR BLD AUTO: 58.6 %
NITRITE UR QL STRIP.AUTO: NEGATIVE
OSMOLALITY SERPL CALC.SUM OF ELEC: 346 MOSM/KG (ref 275–295)
PH UR STRIP.AUTO: 6 [PH] (ref 5–8)
PLATELET # BLD AUTO: 114 10(3)UL (ref 150–450)
POTASSIUM SERPL-SCNC: 3.8 MMOL/L (ref 3.5–5.1)
PROT SERPL-MCNC: 5.8 G/DL (ref 6.4–8.2)
PROT UR STRIP.AUTO-MCNC: 20 MG/DL
RBC # BLD AUTO: 3.86 X10(6)UL
RBC UR QL AUTO: NEGATIVE
SODIUM SERPL-SCNC: 162 MMOL/L (ref 136–145)
SODIUM SERPL-SCNC: 165 MMOL/L (ref 136–145)
SP GR UR STRIP.AUTO: >1.03 (ref 1–1.03)
UROBILINOGEN UR STRIP.AUTO-MCNC: 8 MG/DL
WBC # BLD AUTO: 6.3 X10(3) UL (ref 4–11)

## 2024-02-13 PROCEDURE — 99222 1ST HOSP IP/OBS MODERATE 55: CPT | Performed by: STUDENT IN AN ORGANIZED HEALTH CARE EDUCATION/TRAINING PROGRAM

## 2024-02-13 RX ORDER — LOPERAMIDE HCL 1 MG/7.5ML
4 SOLUTION ORAL 4 TIMES DAILY PRN
COMMUNITY

## 2024-02-13 RX ORDER — ONDANSETRON 2 MG/ML
4 INJECTION INTRAMUSCULAR; INTRAVENOUS EVERY 6 HOURS PRN
Status: DISCONTINUED | OUTPATIENT
Start: 2024-02-13 | End: 2024-02-16

## 2024-02-13 RX ORDER — DEXTROSE AND SODIUM CHLORIDE 5; .45 G/100ML; G/100ML
INJECTION, SOLUTION INTRAVENOUS CONTINUOUS
Status: DISCONTINUED | OUTPATIENT
Start: 2024-02-13 | End: 2024-02-13

## 2024-02-13 RX ORDER — DEXTROSE MONOHYDRATE 50 MG/ML
INJECTION, SOLUTION INTRAVENOUS CONTINUOUS
Status: DISCONTINUED | OUTPATIENT
Start: 2024-02-13 | End: 2024-02-15

## 2024-02-13 RX ORDER — SENNOSIDES 8.6 MG
17.2 TABLET ORAL NIGHTLY PRN
Status: DISCONTINUED | OUTPATIENT
Start: 2024-02-13 | End: 2024-02-16

## 2024-02-13 RX ORDER — ENEMA 19; 7 G/133ML; G/133ML
1 ENEMA RECTAL ONCE AS NEEDED
Status: DISCONTINUED | OUTPATIENT
Start: 2024-02-13 | End: 2024-02-16

## 2024-02-13 RX ORDER — ACETAMINOPHEN 500 MG
500 TABLET ORAL EVERY 4 HOURS PRN
Status: DISCONTINUED | OUTPATIENT
Start: 2024-02-13 | End: 2024-02-16

## 2024-02-13 RX ORDER — METOCLOPRAMIDE HYDROCHLORIDE 5 MG/ML
10 INJECTION INTRAMUSCULAR; INTRAVENOUS EVERY 8 HOURS PRN
Status: DISCONTINUED | OUTPATIENT
Start: 2024-02-13 | End: 2024-02-15

## 2024-02-13 RX ORDER — BISACODYL 10 MG
10 SUPPOSITORY, RECTAL RECTAL
Status: DISCONTINUED | OUTPATIENT
Start: 2024-02-13 | End: 2024-02-16

## 2024-02-13 RX ORDER — ENOXAPARIN SODIUM 100 MG/ML
40 INJECTION SUBCUTANEOUS DAILY
Status: DISCONTINUED | OUTPATIENT
Start: 2024-02-14 | End: 2024-02-16

## 2024-02-13 RX ORDER — VALPROIC ACID 250 MG/5ML
10 SOLUTION ORAL 3 TIMES DAILY
COMMUNITY

## 2024-02-13 RX ORDER — SODIUM CHLORIDE 9 MG/ML
INJECTION, SOLUTION INTRAVENOUS CONTINUOUS
Status: ACTIVE | OUTPATIENT
Start: 2024-02-13 | End: 2024-02-13

## 2024-02-13 RX ORDER — ONDANSETRON 4 MG/1
4 TABLET, ORALLY DISINTEGRATING ORAL EVERY 6 HOURS PRN
COMMUNITY

## 2024-02-13 RX ORDER — ECHINACEA PURPUREA EXTRACT 125 MG
1 TABLET ORAL
Status: DISCONTINUED | OUTPATIENT
Start: 2024-02-13 | End: 2024-02-16

## 2024-02-13 RX ORDER — HALOPERIDOL 5 MG/ML
0.5 INJECTION INTRAMUSCULAR ONCE
Status: COMPLETED | OUTPATIENT
Start: 2024-02-13 | End: 2024-02-13

## 2024-02-13 RX ORDER — POLYETHYLENE GLYCOL 3350 17 G/17G
17 POWDER, FOR SOLUTION ORAL DAILY PRN
Status: DISCONTINUED | OUTPATIENT
Start: 2024-02-13 | End: 2024-02-16

## 2024-02-13 RX ORDER — SENNA AND DOCUSATE SODIUM 50; 8.6 MG/1; MG/1
1 TABLET, FILM COATED ORAL DAILY
COMMUNITY

## 2024-02-14 LAB
ALBUMIN SERPL-MCNC: 2.2 G/DL (ref 3.4–5)
ALBUMIN/GLOB SERPL: 0.8 {RATIO} (ref 1–2)
ALP LIVER SERPL-CCNC: 51 U/L
ALT SERPL-CCNC: 16 U/L
ANION GAP SERPL CALC-SCNC: 4 MMOL/L (ref 0–18)
AST SERPL-CCNC: 20 U/L (ref 15–37)
BILIRUB SERPL-MCNC: 0.6 MG/DL (ref 0.1–2)
BUN BLD-MCNC: 35 MG/DL (ref 9–23)
CALCIUM BLD-MCNC: 7.5 MG/DL (ref 8.5–10.1)
CHLORIDE SERPL-SCNC: 131 MMOL/L (ref 98–112)
CO2 SERPL-SCNC: 27 MMOL/L (ref 21–32)
CREAT BLD-MCNC: 0.99 MG/DL
EGFRCR SERPLBLD CKD-EPI 2021: 78 ML/MIN/1.73M2 (ref 60–?)
GLOBULIN PLAS-MCNC: 2.8 G/DL (ref 2.8–4.4)
GLUCOSE BLD-MCNC: 114 MG/DL (ref 70–99)
OSMOLALITY SERPL CALC.SUM OF ELEC: 343 MOSM/KG (ref 275–295)
POTASSIUM SERPL-SCNC: 3.1 MMOL/L (ref 3.5–5.1)
PROT SERPL-MCNC: 5 G/DL (ref 6.4–8.2)
SODIUM SERPL-SCNC: 162 MMOL/L (ref 136–145)

## 2024-02-14 PROCEDURE — 99232 SBSQ HOSP IP/OBS MODERATE 35: CPT | Performed by: HOSPITALIST

## 2024-02-14 RX ORDER — TAMSULOSIN HYDROCHLORIDE 0.4 MG/1
0.8 CAPSULE ORAL DAILY
Status: DISCONTINUED | OUTPATIENT
Start: 2024-02-14 | End: 2024-02-16

## 2024-02-14 RX ORDER — FINASTERIDE 5 MG/1
5 TABLET, FILM COATED ORAL DAILY
Status: DISCONTINUED | OUTPATIENT
Start: 2024-02-14 | End: 2024-02-16

## 2024-02-14 NOTE — DIETARY NOTE
[facility]    NUTRITION ASSESSMENT    Pt meets moderate malnutrition criteria at this time.    CRITERIA FOR MALNUTRITION DIAGNOSIS:  Criteria for non-severe malnutrition diagnosis: chronic illness related to energy intake less than75% for greater than 1 month, body fat mild depletion, and muscle mass mild depletion      NUTRITION INTERVENTION:    RD nutrition Care Plan- See RD nutrition assessment for additional recommendations  Meal and Snacks - Monitor and encourage adequate PO intake.   Medical Food Supplements - Magic Cup TID. Rationale/use for oral supplements discussed.  Vitamin and Mineral Supplements - adding Multivitamin with minerals  Coordination of Nutrition Care - SLP consult prior to diet advancement.      PATIENT STATUS: 02/14/24 78/M admitted with dehydration. PMH includes HLD, Dementia,  HTN.  Pt seen for MST.  At time of visit, pt sleeping in bed, does not respond to name being called. Has clavicle, shoulder, thigh and calf wasting noted.  No intake this admission - passed SLP eval for pureed, NTL diet.  NO wt hx available.  Add ONS at all meals to maximize intake      ANTHROPOMETRICS:  Ht: 172.7 cm (5' 7.99\")  Wt: 62.5 kg (137 lb 11.2 oz).   BMI: Body mass index is 20.94 kg/m².  IBW: 70 kg      WEIGHT HISTORY:   Weight loss: NATHANAEL    Wt Readings from Last 10 Encounters:   02/13/24 62.5 kg (137 lb 11.2 oz)   03/29/22 63.5 kg (140 lb)   03/24/22 84.8 kg (187 lb)   01/16/09 127.5 kg (281 lb)   08/27/08 127.5 kg (281 lb)        NUTRITION:  Diet:       Procedures    Sodium restricted diet Sodium Restriction: 2 GM NA; Fluid Consistency: Nectar Thick / Mildly Thick Liquids; Texture Consistency: Pureed; Is Patient on Accuchecks? No      Food Allergies: No  Cultural/Ethnic/Pentecostalism Preferences Addressed: Yes    Percent Meals Eaten (last 3 days)       None            GI system review: WNL Last BM: 2/14  Skin and wounds: WNL    NUTRITION RELATED PHYSICAL FINDINGS:     1. Body Fat/Muscle Mass: moderate  depletion body fat Orbital fat pad and moderate muscle depletion Temple region, Clavicle region, Shoulder/Acromion process, Thigh region, and Calf region     2. Fluid Accumulation: none     NUTRITION PRESCRIPTION: 62.5 kg  Calories: 8225-3497 calories/day (30-35 kcal/kg)  Protein: 75-94 grams protein/day (1.2-1.5 grams protein per kg)  Fluid: ~1 ml/kcal or per MD discretion    NUTRITION DIAGNOSIS/PROBLEM:  Malnutrition related to physiological causes as evidenced by documented/reported insufficient oral intake, loss of fat mass, and loss of muscle mass      MONITOR AND EVALUATE/NUTRITION GOALS:  PO intake of 75% of meals TID - New  PO intake of 75% of oral nutrition supplement/s - New  Weight stable within 1 to 2 lbs during admission - New      MEDICATIONS:  Reviewed    LABS:  Reviewed    Pt is at High nutrition risk      Chyna Galicia RD, LDN, CNSC  Clinical Dietitian  Phone [phone]

## 2024-02-14 NOTE — PROGRESS NOTES
NURSING ADMISSION NOTE      Patient admitted via Cart  Oriented to room.  Safety precautions initiated.  Bed in low position.  Call light in reach.    Assumed care at 2100  Vital signs taken  Tele applied   A&ox0  Rm air  NSR/SB on tele  Incontinent   Denies pain  Bed rest  NPO   Speech eval  D5 infusing at 100 ml/hr  Safety precautions in place  All needs met at this time

## 2024-02-14 NOTE — SLP NOTE
ADULT SWALLOWING EVALUATION    ASSESSMENT    ASSESSMENT/OVERALL IMPRESSION:  Patient is a 77 y/o male admitted with abnormal labs and PMHx significant for HTN, OA, and dementia. SLP order received to evaluate oropharyngeal swallow d/t history of modified diet consistencies. Patient previously unknown to this service. No transfer paperwork from pt's LTC facility available. Patient unable to provide history re: baseline diet, but RN received report of mildly thickened liquids. Attempted to contact caregiver at LTC but was left on hold.    Evaluation limited d/t patient's refusal to participate. PO trials of thin and mildly thick liquids, as well as, pureed solids provided. Patient refused solid PO trials. Bolus acceptance was adequate without evidence of anterior bolus loss. Pharyngeal swallow initiation appeared timely and hyolaryngeal excursion was adequate per palpation.  No overt s/s of aspiration observed and patient denied odynophagia and globus sensation across consistencies.     Recommend patient initiate a pureed diet and mildly thick liquids at this time. SLP will continue to follow to monitor diet tolerance and adjust as appropriate. Education provided re: results and recommendations.         RECOMMENDATIONS   Diet Recommendations - Solids: Puree  Diet Recommendations - Liquids: Nectar thick liquids/ Mildly thick                        Compensatory Strategies Recommended: Small bites and sips  Aspiration Precautions: Upright position  Medication Administration Recommendations: One pill at a time  Treatment Plan/Recommendations: Aspiration precautions;SLP to reassess    HISTORY   MEDICAL HISTORY  Reason for Referral: Altered diet consistency    Problem List  Principal Problem:    Dehydration  Active Problems:    Hypernatremia      Past Medical History  Past Medical History:   Diagnosis Date    Dementia (HCC)     Essential hypertension     HYPERLIPIDEMIA     OBESITY     OSTEOARTHRITIS        Prior Living  Situation: Skilled nursing facility  Diet Prior to Admission: Unknown       Patient/Family Goals: none stated    SWALLOWING HISTORY  Current Diet Consistency: NPO  Dysphagia History: as above  Imaging Results: No recent imaging available    SUBJECTIVE       OBJECTIVE   ORAL MOTOR EXAMINATION  Dentition: Functional  Symmetry: Within Functional Limits  Strength: Within Functional Limits     Range of Motion: Within Functional Limits       Voice Quality: Clear  Respiratory Status: Unlabored  Consistencies Trialed: Thin liquids;Nectar thick liquids;Puree  Method of Presentation: Staff/Clinician assistance  Patient Positioning: Upright;Midline    Oral Phase of Swallow: Within Functional Limits (w/ consistencies trialed)                      Pharyngeal Phase of Swallow: Within Functional Limits (w/ consistencies trialed)           (Please note: Silent aspiration cannot be evaluated clinically. Videofluoroscopic Swallow Study is required to rule-out silent aspiration.)    Esophageal Phase of Swallow: No complaints consistent with possible esophageal involvement  Comments: d/w RN              GOALS  Goal #1 The patient will tolerate puree consistency and mildly thick liquids without overt signs or symptoms of aspiration with 95 % accuracy over 1-2 session(s).  In Progress   Goal #2 SLP to re-assess as appropriate.    In Progress   Goal #3     Goal #4     Goal #5     Goal #6     Goal #7     Goal #8       FOLLOW UP  Treatment Plan/Recommendations: Aspiration precautions;SLP to reassess     Follow Up Needed (Documentation Required): Yes  SLP Follow-up Date: 02/15/24    Thank you for your referral.   If you have any questions, please contact MOLLY Parr

## 2024-02-14 NOTE — ED INITIAL ASSESSMENT (HPI)
PT arrives via EMS from NH. Pt had routine labs drawn today, showing sodium of 161, high creatinine, high chloride and high blood glucose. Pt arrives at baseline mentation, A&O to self. Pt is DNR, comfort measures only.

## 2024-02-14 NOTE — PROGRESS NOTES
[facility]     Hospitalist Progress Note     Tommie Eddy Patient Status:  Inpatient    1945 MRN [id number]   [facility] 3NE-A Attending Tre Chen*   Hosp Day # 1 PCP Lai Lew MD     Chief Complaint: confusion    Subjective:     Patient very confused    Objective:    Review of Systems:   A comprehensive review of systems was completed; pertinent positive and negatives stated in subjective.    Vital signs:  Temp:  [97.4 °F (36.3 °C)-98 °F (36.7 °C)] 98 °F (36.7 °C)  Pulse:  [47-68] 52  Resp:  [12-26] 22  BP: ()/(55-76) 105/66  SpO2:  [96 %-100 %] 96 %    Physical Exam:    General: No acute distress, confused  Respiratory: No wheezes, no rhonchi  Cardiovascular: S1, S2, regular rate and rhythm  Abdomen: Soft, Non-tender, non-distended, positive bowel sounds  Neuro: No new focal deficits.   Extremities: No edema      Diagnostic Data:    Labs:  Recent Labs   Lab 24  1839   WBC 6.3   HGB 13.1   .6*   .0*       Recent Labs   Lab 24  1839 24  2113 24  0658   GLU 94  --  114*   BUN 48*  --  35*   CREATSERUM 1.28  --  0.99   CA 8.4*  --  7.5*   ALB 2.5*  --  2.2*   * 165* 162*   K 3.8  --  3.1*   *  --  131*   CO2 23.0  --  27.0   ALKPHO 61  --  51   AST 27  --  20   ALT 20  --  16   BILT 0.9  --  0.6   TP 5.8*  --  5.0*       Estimated Creatinine Clearance: 55.2 mL/min (based on SCr of 0.99 mg/dL).    No results for input(s): \"TROP\", \"TROPHS\", \"CK\" in the last 168 hours.    No results for input(s): \"PTP\", \"INR\" in the last 168 hours.               Microbiology    No results found for this visit on 24.      Imaging: Reviewed in Epic.    Medications:    tamsulosin  0.8 mg Oral Daily    valproic acid  500 mg Oral TID    finasteride  5 mg Oral Daily    enoxaparin  40 mg Subcutaneous Daily       Assessment & Plan:      #HyperNatremia likely due to dementia/poor oral intake  IVF. Cont d5w  Trend    #Dementia    #Malnutrition  Due to dementia    #Thrombocytopenia  #GOC- need to verify code status   -attempted to call spouse twice, left VM.       Christo Mcclain MD    Supplementary Documentation:     Quality:  DVT Mechanical Prophylaxis:   SCDs,    DVT Pharmacologic Prophylaxis   Medication    enoxaparin (Lovenox) 40 MG/0.4ML SUBQ injection 40 mg                Code Status: Not on file  Chin: No urinary catheter in place  Chin Duration (in days):   Central line:    SHANNON:     Discharge is dependent on: course  At this point Mr. Eddy is expected to be discharge to: home    The 21st Century Cures Act makes medical notes like these available to patients in the interest of transparency. Please be advised this is a medical document. Medical documents are intended to carry relevant information, facts as evident, and the clinical opinion of the practitioner. The medical note is intended as peer to peer communication and may appear blunt or direct. It is written in medical language and may contain abbreviations or verbiage that are unfamiliar.             **Certification      PHYSICIAN Certification of Need for Inpatient Hospitalization - Initial Certification    Patient will require inpatient services that will reasonably be expected to span two midnight's based on the clinical documentation in H+P.   Based on patients current state of illness, I anticipate that, after discharge, patient will require TBD.

## 2024-02-14 NOTE — CM/SW NOTE
02/14/24 0900   CM/SW Referral Data   Referral Source Social Work (self-referral)   Reason for Referral Discharge planning   Informant EMR;Clinical Staff Member   Patient Info   Patient's Home Environment Long Term Care Facility   Post Acute Care Provider Upon Admission [facility]   Discharge Needs   Anticipated D/C needs Long term care facility;Transportation services;To be determined     Pt is a 79 y/o male admitted with dehydration. Chart reviewed for discharge planning needs and noted pt is a LTC resident at [facility]. ALONSO sent referral to [facility] in [facility] and received message from Flaquita at [facility] confirming pt is a LTC resident. [facility] reserved in [facility]. SW will continue to follow.     KASEY Wick  Discharge Planner

## 2024-02-14 NOTE — PLAN OF CARE
Assumed care at 0730  A/Ox self, RA, VSS  Tele, NSR/SB  Denies n/v & pain   K replaced  PIV L wrist, D5 @100mL/hr  SPL recommending, nectar thick/pureed  Incontinent, brief/chucked  Safety measures in place  All needs met at this time    Problem: METABOLIC/FLUID AND ELECTROLYTES - ADULT  Goal: Electrolytes maintained within normal limits  Description: INTERVENTIONS:  - Monitor labs and rhythm and assess patient for signs and symptoms of electrolyte imbalances  - Administer electrolyte replacement as ordered  - Monitor response to electrolyte replacements, including rhythm and repeat lab results as appropriate  - Fluid restriction as ordered  - Instruct patient on fluid and nutrition restrictions as appropriate  Outcome: Progressing  Goal: Hemodynamic stability and optimal renal function maintained  Description: INTERVENTIONS:  - Monitor labs and assess for signs and symptoms of volume excess or deficit  - Monitor intake, output and patient weight  - Monitor urine specific gravity, serum osmolarity and serum sodium as indicated or ordered  - Monitor response to interventions for patient's volume status, including labs, urine output, blood pressure (other measures as available)  - Encourage oral intake as appropriate  - Instruct patient on fluid and nutrition restrictions as appropriate  Outcome: Progressing     Problem: SAFETY ADULT - FALL  Goal: Free from fall injury  Description: INTERVENTIONS:  - Assess pt frequently for physical needs  - Identify cognitive and physical deficits and behaviors that affect risk of falls.  - [place] fall precautions as indicated by assessment.  - Educate pt/family on patient safety including physical limitations  - Instruct pt to call for assistance with activity based on assessment  - Modify environment to reduce risk of injury  - Provide assistive devices as appropriate  - Consider OT/PT consult to assist with strengthening/mobility  - Encourage toileting schedule  Outcome:  Progressing

## 2024-02-14 NOTE — ED QUICK NOTES
Orders for admission, patient is aware of plan and ready to go upstairs. Any questions, please call ED RN kevan at extension [phone].     Patient Covid vaccination status: Unvaccinated     COVID Test Ordered in ED: None    COVID Suspicion at Admission: N/A    Running Infusions:  None    Mental Status/LOC at time of transport: A&O to self/ disoriented    Other pertinent information: yells a lot, can be slightly combative. DNR/ comfort measures  CIWA score: N/A   NIH score:  N/A

## 2024-02-14 NOTE — H&P
[facility]     Tommie Eddy Patient Status:  Emergency    1945 MRN [id number]   Location [facility] EMERGENCY DEPARTMENT Attending Carlos Melgar MD   Hosp Day # 0 PCP Lai Lew MD     Chief Complaint: abnormal labs     Subjective:    History of Present Illness:     Tommie Eddy is a 78 year old male with  h/o dementia, HTN, OA. Pt is presenting with abnormal Na- history limited due to patient factors.    History/Other:    Past Medical History:  Past Medical History:   Diagnosis Date    Dementia (HCC)     Essential hypertension     HYPERLIPIDEMIA     OBESITY     OSTEOARTHRITIS      Past Surgical History:   Past Surgical History:   Procedure Laterality Date    EXPLOR ANKLE JOINT  1986    HERNIA SURGERY      TONSILLECTOMY        Family History:   Family History   Problem Relation Age of Onset    Diabetes Father     Cancer Mother         breast     Social History:    reports that he quit smoking about 38 years ago. He has a 20 pack-year smoking history. He does not have any smokeless tobacco history on file. He reports current alcohol use of about 5.8 standard drinks of alcohol per week. He reports that he does not use drugs.     Allergies:   Allergies   Allergen Reactions    Aricept [Donepezil] UNKNOWN    Latex RASH       Medications:    No current facility-administered medications on file prior to encounter.     Current Outpatient Medications on File Prior to Encounter   Medication Sig Dispense Refill    enoxaparin 40 MG/0.4ML Subcutaneous Solution Inject 0.4 mL (40 mg total) into the skin daily.  0    acetaminophen 325 MG Oral Tab Take 650 mg by mouth every 8 (eight) hours as needed for Pain.      ammonium lactate 12 % External Lotion Apply topically daily.      Cholecalciferol 25 MCG (1000 UT) Oral Tab Take 1,000 Units by mouth daily.      QUEtiapine 25 MG Oral Tab Take 0.5 tablets (12.5 mg total) by mouth nightly.  0    melatonin 3 MG Oral Tab Take 6 mg by  mouth nightly.      polyethylene glycol, PEG 3350, 17 g Oral Powd Pack Take 17 g by mouth daily.      finasteride 5 MG Oral Tab Take 5 mg by mouth daily.      tamsulosin (FLOMAX) cap Take 0.8 mg by mouth daily. 2 tablets daily         Review of Systems:   A comprehensive review of systems could not be completed.    Pertinent positives and negatives noted in the HPI.    Objective:   Physical Exam:    /74   Pulse 63   Temp 98 °F (36.7 °C) (Temporal)   Resp 26   SpO2 100%   General: not alert  Respiratory: No rhonchi, no wheezes  Cardiovascular: S1, S2. Regular rate and rhythm  Abdomen: Soft,  non-distended, positive bowel sounds  Neuro: unable to assess   Extremities: No edema      Results:    Labs:      Labs Last 24 Hours:    Recent Labs   Lab 02/13/24  1839   RBC 3.86   HGB 13.1   HCT 42.3   .6*   MCH 33.9   MCHC 31.0   RDW 13.8   NEPRELIM 3.70   WBC 6.3   .0*       Recent Labs   Lab 02/13/24  1839   GLU 94   BUN 48*   CREATSERUM 1.28   EGFRCR 58*   CA 8.4*   ALB 2.5*   *   K 3.8   *   CO2 23.0   ALKPHO 61   AST 27   ALT 20   BILT 0.9   TP 5.8*       Lab Results   Component Value Date    INR 1.06 03/29/2022       No results for input(s): \"TROP\", \"TROPHS\", \"CK\" in the last 168 hours.    No results for input(s): \"TROP\", \"PBNP\" in the last 168 hours.    No results for input(s): \"PCT\" in the last 168 hours.    Imaging: Imaging data reviewed in Epic.    Assessment & Plan:      #HyperNatremia likely due to dementia/poor oral intake  IVF  Repeat in AM  #Dementia   #Malnutrition  Due to dementia    #Thrombocytopenia  #GOC- need to verify code status     MED rec pending       Plan of care discussed with Dr Ramón Neri MD    Supplementary Documentation:     The 21st Century Cures Act makes medical notes like these available to patients in the interest of transparency. Please be advised this is a medical document. Medical documents are intended to carry relevant information,  facts as evident, and the clinical opinion of the practitioner. The medical note is intended as peer to peer communication and may appear blunt or direct. It is written in medical language and may contain abbreviations or verbiage that are unfamiliar.

## 2024-02-14 NOTE — ED PROVIDER NOTES
Patient Seen in: [facility]      History     Chief Complaint   Patient presents with    Abnormal Labs     Stated Complaint: ab labs    Subjective:   HPI    Patient was sent over for abnormal labs reportedly had a sodium of 161 unclear what led up to the labs or whether this was routine testing.  The patient has dementia and is confused as per baseline.    Objective:   Past Medical History:   Diagnosis Date    Dementia (HCC)     Essential hypertension     HYPERLIPIDEMIA     OBESITY     OSTEOARTHRITIS               Past Surgical History:   Procedure Laterality Date    EXPLOR ANKLE JOINT      HERNIA SURGERY      TONSILLECTOMY                  Social History     Socioeconomic History    Marital status:     Number of children: 0   Occupational History    Occupation:    Tobacco Use    Smoking status: Former     Packs/day: 1.00     Years: 20.00     Additional pack years: 0.00     Total pack years: 20.00     Types: Cigarettes     Quit date: 1986     Years since quittin.1   Substance and Sexual Activity    Alcohol use: Yes     Alcohol/week: 5.8 standard drinks of alcohol     Types: 7 Glasses of wine per week     Comment: 1 wine daily    Drug use: No    Sexual activity: Yes     Partners: Female   Other Topics Concern     Service Yes     Comment: [employer] 3 years    Blood Transfusions No    Exercise Yes     Comment: walk to and from train    Seat Belt Yes    Self-Exams Yes              Review of Systems    Positive for stated complaint: ab labs  Other systems are as noted in HPI.  Constitutional and vital signs reviewed.      All other systems reviewed and negative except as noted above.    Physical Exam     ED Triage Vitals [24 1831]   /74   Pulse 63   Resp 26   Temp 98 °F (36.7 °C)   Temp src Temporal   SpO2 100 %   O2 Device None (Room air)       Current:/75 (BP Location: Left arm)   Pulse 62   Temp 97.4 °F (36.3 °C) (Axillary)   Resp 15   Ht 172.7  cm (5' 7.99\")   Wt 62.5 kg   SpO2 100%   BMI 20.94 kg/m²         Physical Exam    Patient is alert and yelling and screaming intermittently at times will say words but not responding questions appropriately has HEENT exam pupils are equal round react to light oral mucosa is dry neck there is no JVD or lymphadenopathy lungs are clear cardiovascular exam shows regular rate and rhythm without murmurs abdomen is soft and nontender extremities no clubbing sinus edema neurologic exam the patient is confused ANO x 1 but as per baseline motor functions gross intact sensations grossly intact.    ED Course     Labs Reviewed   COMP METABOLIC PANEL (14) - Abnormal; Notable for the following components:       Result Value    Sodium 162 (*)     Chloride 135 (*)     BUN 48 (*)     Calcium, Total 8.4 (*)     Calculated Osmolality 346 (*)     eGFR-Cr 58 (*)     Total Protein 5.8 (*)     Albumin 2.5 (*)     A/G Ratio 0.8 (*)     All other components within normal limits   URINALYSIS WITH CULTURE REFLEX - Abnormal; Notable for the following components:    Spec Gravity >1.030 (*)     Ketones Urine 20 (*)     Protein Urine 20 (*)     Urobilinogen Urine 8 (*)     All other components within normal limits   SODIUM, SERUM - Abnormal; Notable for the following components:    Sodium 165 (*)     All other components within normal limits   CBC W/ DIFFERENTIAL - Abnormal; Notable for the following components:    .0 (*)     .6 (*)     All other components within normal limits   POCT GLUCOSE - Normal   CBC WITH DIFFERENTIAL WITH PLATELET    Narrative:     The following orders were created for panel order CBC With Differential With Platelet.  Procedure                               Abnormality         Status                     ---------                               -----------         ------                     CBC W/ DIFFERENTIAL[[id number]]          Abnormal            Final result                 Please view results for these  tests on the individual orders.   RAINBOW DRAW LAVENDER   RAINBOW DRAW LIGHT GREEN   RAINBOW DRAW BLUE     Abnormal Labs Reviewed   COMP METABOLIC PANEL (14) - Abnormal; Notable for the following components:       Result Value    Sodium 162 (*)     Chloride 135 (*)     BUN 48 (*)     Calcium, Total 8.4 (*)     Calculated Osmolality 346 (*)     eGFR-Cr 58 (*)     Total Protein 5.8 (*)     Albumin 2.5 (*)     A/G Ratio 0.8 (*)     All other components within normal limits   URINALYSIS WITH CULTURE REFLEX - Abnormal; Notable for the following components:    Spec Gravity >1.030 (*)     Ketones Urine 20 (*)     Protein Urine 20 (*)     Urobilinogen Urine 8 (*)     All other components within normal limits   SODIUM, SERUM - Abnormal; Notable for the following components:    Sodium 165 (*)     All other components within normal limits   CBC W/ DIFFERENTIAL - Abnormal; Notable for the following components:    .0 (*)     .6 (*)     All other components within normal limits               No results found.           MDM      Initial differential diagnosis includes dehydration and hypernatremia sepsis    Patient has severe hypernatremic dehydration patient was given IV fluids and admitted for further workup  Admission disposition: 2/13/2024  7:40 PM                                        Medical Decision Making      Disposition and Plan     Clinical Impression:  1. Dehydration    2. Hypernatremia         Disposition:  Admit  2/13/2024  7:40 pm    Follow-up:  No follow-up provider specified.        Medications Prescribed:  Current Discharge Medication List                            Hospital Problems       Present on Admission  Date Reviewed: 3/29/2022            ICD-10-CM Noted POA    * (Principal) Dehydration E86.0 2/13/2024 Unknown    Hypernatremia E87.0 2/13/2024 Unknown

## 2024-02-15 PROBLEM — Z71.89 ADVANCED CARE PLANNING/COUNSELING DISCUSSION: Status: ACTIVE | Noted: 2024-02-15

## 2024-02-15 LAB
ANION GAP SERPL CALC-SCNC: 6 MMOL/L (ref 0–18)
BASOPHILS # BLD AUTO: 0.03 X10(3) UL (ref 0–0.2)
BASOPHILS NFR BLD AUTO: 0.5 %
BUN BLD-MCNC: 22 MG/DL (ref 9–23)
CALCIUM BLD-MCNC: 7.8 MG/DL (ref 8.5–10.1)
CHLORIDE SERPL-SCNC: 124 MMOL/L (ref 98–112)
CO2 SERPL-SCNC: 23 MMOL/L (ref 21–32)
CREAT BLD-MCNC: 0.83 MG/DL
EGFRCR SERPLBLD CKD-EPI 2021: 90 ML/MIN/1.73M2 (ref 60–?)
EOSINOPHIL # BLD AUTO: 0.05 X10(3) UL (ref 0–0.7)
EOSINOPHIL NFR BLD AUTO: 0.9 %
ERYTHROCYTE [DISTWIDTH] IN BLOOD BY AUTOMATED COUNT: 13 %
GLUCOSE BLD-MCNC: 104 MG/DL (ref 70–99)
HCT VFR BLD AUTO: 40.2 %
HGB BLD-MCNC: 13.5 G/DL
IMM GRANULOCYTES # BLD AUTO: 0.06 X10(3) UL (ref 0–1)
IMM GRANULOCYTES NFR BLD: 1.1 %
LYMPHOCYTES # BLD AUTO: 2.02 X10(3) UL (ref 1–4)
LYMPHOCYTES NFR BLD AUTO: 37 %
MAGNESIUM SERPL-MCNC: 2.5 MG/DL (ref 1.6–2.6)
MCH RBC QN AUTO: 34.5 PG (ref 26–34)
MCHC RBC AUTO-ENTMCNC: 33.6 G/DL (ref 31–37)
MCV RBC AUTO: 102.8 FL
MONOCYTES # BLD AUTO: 0.49 X10(3) UL (ref 0.1–1)
MONOCYTES NFR BLD AUTO: 9 %
NEUTROPHILS # BLD AUTO: 2.81 X10 (3) UL (ref 1.5–7.7)
NEUTROPHILS # BLD AUTO: 2.81 X10(3) UL (ref 1.5–7.7)
NEUTROPHILS NFR BLD AUTO: 51.5 %
OSMOLALITY SERPL CALC.SUM OF ELEC: 320 MOSM/KG (ref 275–295)
PHOSPHATE SERPL-MCNC: 1.9 MG/DL (ref 2.5–4.9)
PLATELET # BLD AUTO: 105 10(3)UL (ref 150–450)
POTASSIUM SERPL-SCNC: 2.8 MMOL/L (ref 3.5–5.1)
POTASSIUM SERPL-SCNC: 2.8 MMOL/L (ref 3.5–5.1)
POTASSIUM SERPL-SCNC: 3.2 MMOL/L (ref 3.5–5.1)
RBC # BLD AUTO: 3.91 X10(6)UL
SODIUM SERPL-SCNC: 153 MMOL/L (ref 136–145)
WBC # BLD AUTO: 5.5 X10(3) UL (ref 4–11)

## 2024-02-15 PROCEDURE — 99497 ADVNCD CARE PLAN 30 MIN: CPT | Performed by: HOSPITALIST

## 2024-02-15 PROCEDURE — 99232 SBSQ HOSP IP/OBS MODERATE 35: CPT | Performed by: HOSPITALIST

## 2024-02-15 RX ORDER — HALOPERIDOL 5 MG/ML
5 INJECTION INTRAMUSCULAR EVERY 4 HOURS PRN
Status: DISCONTINUED | OUTPATIENT
Start: 2024-02-15 | End: 2024-02-16

## 2024-02-15 RX ORDER — DEXTROSE MONOHYDRATE, SODIUM CHLORIDE, AND POTASSIUM CHLORIDE 50; .745; 4.5 G/1000ML; G/1000ML; G/1000ML
INJECTION, SOLUTION INTRAVENOUS CONTINUOUS
Status: DISCONTINUED | OUTPATIENT
Start: 2024-02-15 | End: 2024-02-16

## 2024-02-15 NOTE — SLP NOTE
SPEECH DAILY NOTE - INPATIENT    ASSESSMENT & PLAN   ASSESSMENT  Pt seen for dysphagia tx to assess tolerance with recommended diet, ensure proper utilization of aspiration precautions and provide pt/family education.  Patient drowsy, and minimally arousable, in bed. He was initially agreeable to PO trials to assess diet tolerance and ready for advancement. However, he quickly began to refuse further PO intake. No overt s/s of aspiration observed with mildly thick liquids accepted. He refused all solids PO intake. Transfer paperwork from LT facility received and indicated a regular diet with mildly thick liquids at baseline. Recommend patient continue a pureed diet and mildly thick liquids. SLP will continue to follow per POC.    Diet Recommendations - Solids: Puree  Diet Recommendations - Liquids: Nectar thick liquids/ Mildly thick    Compensatory Strategies Recommended: Small bites and sips  Aspiration Precautions: Upright position  Medication Administration Recommendations: One pill at a time    Patient Experiencing Pain: No                Treatment Plan  Treatment Plan/Recommendations: SLP to reassess;Aspiration precautions    Interdisciplinary Communication: Discussed with RN            GOALS  Goal #1 The patient will tolerate puree consistency and mildly thick liquids without overt signs or symptoms of aspiration with 95 % accuracy over 1-2 session(s).  In Progress   Goal #2 SLP to re-assess as appropriate.     In Progress   Goal #3       Goal #4       Goal #5       Goal #6       Goal #7       Goal #8            FOLLOW UP  Follow Up Needed (Documentation Required): Yes  SLP Follow-up Date: 02/16/24       Session: 1    If you have any questions, please contact MOLLY Parr

## 2024-02-15 NOTE — CONSULTS
Palliative Care  Palliative Care Consult request from Dr. Mcclain noted requesting discussion for goals of care. Chart reviewed noting ongoing attempts by SW to obtain information regarding Guardianship/decision maker. Appreciate their assistance. Per notes, [place] ([place]) is advocating to resume hospice care but acknowledges she is not the current legal decision maker. When decision maker identified, if palliative intervention for GOC remain, our service will initiate full consult.     HPI: patient with dementia, HTN, OA from [facility] with abnormal labs/hypernatremia.   POLST form signed by spouse created in 2022 indicating wishes for DNAR/comfort focused treatment, was on hard chart and scanned to EMR.     CELINA Richards  Palliative Care   Phone: [phone]     2/15/2024  1:13 PM    UPDATE:   Dr. Mcclain spoke with guardian Sarah Boswell ([phone]) with [facility]. Both in agreement patient could return to facility and resume hospice care.   Will cancel consult. Please reach out if GOC/POC changes.     CELINA Richards  Palliative Care   Phone: [phone]     2/15/2024  3:21 PM

## 2024-02-15 NOTE — DISCHARGE SUMMARY
[facility]     Tommie Eddy Patient Status:  Inpatient    1945 MRN [id number]   Location [facility] Attending Christo Mcclain MD   Hosp Day # 2 PCP Lai Lew MD     Date of Admission: 2024  Date of Discharge:   24    Discharge Disposition: NH w/ hospice    Discharge Diagnosis:  FTT  Hypernatremia  Dementia  Malnutrition  Thrombocytopenia    History of Present Illness: Tommie Eddy is a 78 year old male with  h/o dementia, HTN, OA. Pt is presenting with abnormal Na- history limited due to patient factors.       Brief Synopsis: pt brought to hospital for high Na. Pt severely dehydrated, refusing to eat. Very confused. Reportedly, pt used to be in hospice, is unclear why he was taken out. D/w wife who states she lost guardianship but agrees pt should be in hospice. Was able to find the state appted guardian who also agrees pt should be hospice. Pt seems to be declining rapidly and is more than appropriate for hospice at this time. Will DC back to NH w/ hospice. The guardian will reach out to the pt's previous hospice company to resume care.     Lace+ Score: 61  59-90 High Risk  29-58 Medium Risk  0-28   Low Risk       TCM Follow-Up Recommendation:  LACE > 58: High Risk of readmission after discharge from the hospital.      Procedures during hospitalization:   none    Incidental or significant findings and recommendations (brief descriptions):  none    Lab/Test results pending at Discharge:   none    Consultants:  palliaitive    Discharge Medication List:     Discharge Medications        CONTINUE taking these medications        Instructions Prescription details   acetaminophen 325 MG Tabs  Commonly known as: Tylenol      Take 2 tablets (650 mg total) by mouth every 4 (four) hours as needed for Pain or Fever.   Refills: 0     enoxaparin 40 MG/0.4ML Sosy  Commonly known as: Lovenox      Inject 0.4 mL (40 mg total) into the skin daily.   Refills: 0      finasteride 5 MG Tabs  Commonly known as: Proscar      Take 1 tablet (5 mg total) by mouth daily.   Refills: 0     loperamide 1 MG/7.5ML Soln  Commonly known as: Imodium      Take 30 mL (4 mg total) by mouth 4 (four) times daily as needed for Diarrhea.   Refills: 0     melatonin 3 MG Tabs      Take 2 tablets (6 mg total) by mouth nightly.   Refills: 0     ondansetron 4 MG Tbdp  Commonly known as: Zofran-ODT      Take 1 tablet (4 mg total) by mouth every 6 (six) hours as needed for Nausea.   Refills: 0     senna-docusate 8.6-50 MG Tabs  Commonly known as: Senokot-S      Take 1 tablet by mouth daily.   Refills: 0     tamsulosin 0.4 MG Caps  Commonly known as: Flomax      Take 2 capsules (0.8 mg total) by mouth daily. 2 tablets daily   Refills: 0     Valproic Acid 250 MG/5ML Soln      Take 10 mL by mouth in the morning and 10 mL at noon and 10 mL in the evening.   Refills: 0              [place]    Follow-up appointment:   No follow-up provider specified.  Appointments for Next 30 Days 2/15/2024 - 3/16/2024      None            Vital signs:  Temp:  [97.1 °F (36.2 °C)-97.8 °F (36.6 °C)] 97.3 °F (36.3 °C)  Pulse:  [50-64] 61  Resp:  [17-20] 18  BP: ()/(62-74) 111/70  SpO2:  [98 %-100 %] 98 %    Physical Exam:    General: No acute distress   Lungs: clear to auscultation  Cardiovascular: S1, S2  Abdomen: Soft      -----------------------------------------------------------------------------------------------  PATIENT DISCHARGE INSTRUCTIONS: See electronic chart    Christo Mcclain MD    Total time spent on discharge plannin minutes     The  Cent[place] Cures Act makes medical notes like these available to patients in the interest of transparency. Please be advised this is a medical document. Medical documents are intended to carry relevant information, facts as evident, and the clinical opinion of the practitioner. The medical note is intended as peer to peer communication and may appear blunt or  direct. It is written in medical language and may contain abbreviations or verbiage that are unfamiliar.

## 2024-02-15 NOTE — PLAN OF CARE
Assumed care at 1930  Alert to self  Room air  NSR/ SB HR in 50s on tele  Non cardiac electrolyte protocol  Lovenox for VTE  2GM NA restriction diet, pureed, nectar thick  Purewick, Chucked & briefed  PIV L upper arm infusing D5 @ 100ml/hr  Posey and wrist restraints in place, renewal @ 1600  Call light within reach, safety precautions in place  All needs met at this time    Problem: METABOLIC/FLUID AND ELECTROLYTES - ADULT  Goal: Electrolytes maintained within normal limits  Description: INTERVENTIONS:  - Monitor labs and rhythm and assess patient for signs and symptoms of electrolyte imbalances  - Administer electrolyte replacement as ordered  - Monitor response to electrolyte replacements, including rhythm and repeat lab results as appropriate  - Fluid restriction as ordered  - Instruct patient on fluid and nutrition restrictions as appropriate  Outcome: Progressing     Problem: METABOLIC/FLUID AND ELECTROLYTES - ADULT  Goal: Hemodynamic stability and optimal renal function maintained  Description: INTERVENTIONS:  - Monitor labs and assess for signs and symptoms of volume excess or deficit  - Monitor intake, output and patient weight  - Monitor urine specific gravity, serum osmolarity and serum sodium as indicated or ordered  - Monitor response to interventions for patient's volume status, including labs, urine output, blood pressure (other measures as available)  - Encourage oral intake as appropriate  - Instruct patient on fluid and nutrition restrictions as appropriate  Outcome: Progressing     Problem: Safety Risk - Non-Violent Restraints  Goal: Patient will remain free from self-harm  Description: INTERVENTIONS:  - Apply the least restrictive restraint to prevent harm  - Notify patient and family of reasons restraints applied  - Assess for any contributing factors to confusion (electrolyte disturbances, delirium, medications)  - Discontinue any unnecessary medical devices as soon as possible  - Assess the  Cardiology Progress Note               Author: Kecia Levy, APRN Date & Time created: 2018  9:05 AM     Interval History:  Pt had been experiencing CP and was transferred from [place], [place] to here. The CP is described as crushing which came and went every 15-20 minutes. She aslo experienced nausea and diaphoresis. Her troponin was 0.1.    The pt has had 4V CABG in  in [place], [place]. Reportedly the pt had an angiogram and the pt had a stroke.     Both legs have been amputated with severe PVD, DM.    Chief Complaint:  Cardiology consultation for abnormal stress test.     Telemetry: SR 60-70s    18: Pt lying in bed in no distress. She reports some intermittent arm pain which radiates down arm and hand and have tingling sensation or cramp while this is happening.     Review of Systems   Constitutional: Negative for chills and fever.   Respiratory: Negative for cough, hemoptysis, sputum production, shortness of breath and wheezing.    Cardiovascular: Negative for chest pain, palpitations, orthopnea, claudication, leg swelling and PND.   Gastrointestinal: Negative for nausea and vomiting.   Neurological: Negative for dizziness and headaches.       Physical Exam   Constitutional: She is oriented to person, place, and time. She appears well-developed and well-nourished.   HENT:   Head: Normocephalic and atraumatic.   Eyes: EOM are normal.   Neck: Neck supple.   Cardiovascular: Normal rate, regular rhythm and normal heart sounds.    No murmur heard.  Pulmonary/Chest: Effort normal. She has rales in the right lower field and the left lower field.   Abdominal: Soft. Bowel sounds are normal.   Musculoskeletal: She exhibits no edema.   Neurological: She is alert and oriented to person, place, and time.   Skin: Skin is warm and dry.   Psychiatric: She has a normal mood and affect. Her behavior is normal. Judgment and thought content normal.       Hemodynamics:  Temp (24hrs), Av.3 °C (97.4 °F), Min:36.1 °C (97  patient's physical comfort, circulation, skin condition, hydration, nutrition and elimination needs   - Reorient and redirection as needed  - Assess for the need to continue restraints  Outcome: Progressing      °F), Max:36.7 °C (98.1 °F)  Temperature: 36.7 °C (98.1 °F)  Pulse  Av.4  Min: 61  Max: 82   Blood Pressure: 125/73     Respiratory:    Respiration: 18, Pulse Oximetry: 92 %  Fluids:     Weight: 55.5 kg (122 lb 5.7 oz)  GI/Nutrition:  Orders Placed This Encounter   Procedures   • Diet Order Diabetic     Standing Status:   Standing     Number of Occurrences:   1     Order Specific Question:   Diet:     Answer:   Diabetic [3]     Lab Results:  Recent Labs      18   0536   WBC  7.1   RBC  4.99   HEMOGLOBIN  13.3   HEMATOCRIT  44.3   MCV  88.8   MCH  26.7*   MCHC  30.0*   RDW  58.4*   PLATELETCT  204   MPV  9.8     Recent Labs      18   0536   SODIUM  134*  132*   POTASSIUM  4.9  4.9   CHLORIDE  101  103   CO2  23  24   GLUCOSE  329*  377*   BUN  19  23*   CREATININE  1.25  1.48*   CALCIUM  7.6*  7.3*         Recent Labs      18   BNPBTYPENAT  607*     Recent Labs      18   2225  18   0536  18   1110   TROPONINI  0.16*  0.17*  0.11*  0.09*   BNPBTYPENAT  607*   --    --    --      Recent Labs      18   0915   TRIGLYCERIDE  157*   HDL  32*   LDL  69     Echocardiography Laboratory 18  CONCLUSIONS  No prior study is available for comparison.   Reduced left ventricular systolic function. Left ventricular ejection fraction is visually estimated to be 35%.   No evidence of valvular abnormality based on Doppler evaluation.     Myocardial Perfusion Report 18  NUCLEAR IMAGING INTERPRETATION  There is a moderate sized infarct of the inferior lateral wall.  There is no significant reversible ischemia.  There is global hypokinesis with  moderate dilatation of the LV cavity.  LV EF is 40%.  ECG INTERPRETATION  Negative stress ECG for ischemia.    Medical Decision Making, by Problem:  Active Hospital Problems    Diagnosis   • *NSTEMI (non-ST elevated myocardial infarction) ([facility]) [I21.4]   • Chronic systolic CHF (congestive heart failure)  ([facility]) [I50.22]   • CAD (coronary artery disease) [I25.10]   • Type 2 diabetes mellitus with hyperglycemia ([facility]) [E11.65]   • Essential hypertension [I10]   • CKD (chronic kidney disease) stage 3, GFR 30-59 ml/min [N18.3]   • Hx of BKA ([facility]) [Z89.519]     Plan:  1. NSTEMI  -Cont ASA 81 mg, atorvastatin 20 mg daily, carvedilol 12.5 mg BID, and Lisinopril 5 mg dialy.    2. Chronic systolic heart failure  -Cont BB, furosemide 20 mg BID, Spironolactone 25 mg daily, and Lisinopril 5 mg daily  -Euvolemic on exam, although pt feels abdomen is swollen  -DC fluids    3. HTN  -Cont lisinopril, furosemide, carvedilol, and spironolactone    Thank you for allowing us to participate in the care of your patient. Will sign off. Please call with any questions.    Quality-Core Measures   Reviewed items::  EKG reviewed, Labs reviewed, Medications reviewed and Radiology images reviewed  Escobar catheter::  No Escobar  DVT prophylaxis pharmacological::  Enoxaparin (Lovenox)

## 2024-02-15 NOTE — PROGRESS NOTES
[facility]     Hospitalist Progress Note     Tommie Eddy Patient Status:  Inpatient    1945 MRN [id number]   [facility] 3NE-A Attending Tre Chen*   Hosp Day # 2 PCP Lai Lew MD     Chief Complaint: confusion    Subjective:     Patient very confused. Restrained, sedated o/n     Objective:    Review of Systems:   A comprehensive review of systems was completed; pertinent positive and negatives stated in subjective.    Vital signs:  Temp:  [97.1 °F (36.2 °C)-98 °F (36.7 °C)] 97.5 °F (36.4 °C)  Pulse:  [50-64] 55  Resp:  [17-22] 17  BP: ()/(62-74) 132/74  SpO2:  [96 %-100 %] 100 %    Physical Exam:    General: No acute distress, confused  Respiratory: No wheezes, no rhonchi  Cardiovascular: S1, S2, regular rate and rhythm  Abdomen: Soft, Non-tender, non-distended, positive bowel sounds  Neuro: No new focal deficits.   Extremities: No edema      Diagnostic Data:    Labs:  Recent Labs   Lab 02/13/24  1839 02/15/24  0823   WBC 6.3 5.5   HGB 13.1 13.5   .6* 102.8*   .0* 105.0*       Recent Labs   Lab 24  2113 24  0658 02/15/24  0617   GLU 94  --  114* 104*   BUN 48*  --  35* 22   CREATSERUM 1.28  --  0.99 0.83   CA 8.4*  --  7.5* 7.8*   ALB 2.5*  --  2.2*  --    * 165* 162* 153*   K 3.8  --  3.1* 2.8*  2.8*   *  --  131* 124*   CO2 23.0  --  27.0 23.0   ALKPHO 61  --  51  --    AST 27  --  20  --    ALT 20  --  16  --    BILT 0.9  --  0.6  --    TP 5.8*  --  5.0*  --        Estimated Creatinine Clearance: 65.9 mL/min (based on SCr of 0.83 mg/dL).    No results for input(s): \"TROP\", \"TROPHS\", \"CK\" in the last 168 hours.    No results for input(s): \"PTP\", \"INR\" in the last 168 hours.               Microbiology    No results found for this visit on 24.      Imaging: Reviewed in Epic.    Medications:    potassium phosphate dibasic 15 mmol in sodium chloride 0.9% 250 mL IVPB  15 mmol Intravenous Once    Followed  by    potassium chloride  40 mEq Intravenous Once    tamsulosin  0.8 mg Oral Daily    valproic acid  500 mg Oral TID    finasteride  5 mg Oral Daily    enoxaparin  40 mg Subcutaneous Daily       Assessment & Plan:      #HyperNatremia likely due to dementia/poor oral intake  #hypokalemia  Correcting. Change to d5 1/2 NS w/ KCl  Trend  Per wife, pt has intermittent periods of eating and not eating    #Dementia   #Malnutrition  Due to dementia    #Thrombocytopenia    #advanced care planning  -had voluntary discussion w/ wife who states she is in poor medical condition herself. States she had to move to [place] to be with family and has not seen the pt in 2 years. States POA was taken away from her by the courts and given to the nursing home. Pt was previously in hospice but was taken out by the NH against her wishes. Went over code status and she agrees pt should be in hospice but that she no longer has the POA to make that decision. There is a POLST for DNR/comfort. I agree, pt should be hospice. D/w SW who will look into who has POA. Consult palliative care as well.  >17 min spent    Christo Mcclain MD    ADDENDUM:  D/w pt's state appt guardian Sarah Boswell. She agreed pt is appropriate for hospice and will contact the pt's previous hospice company. Ok to DC pt back to NH with plans for outpt hospice.    Christo Mcclain MD    Supplementary Documentation:     Quality:  DVT Mechanical Prophylaxis:   SCDs,    DVT Pharmacologic Prophylaxis   Medication    enoxaparin (Lovenox) 40 MG/0.4ML SUBQ injection 40 mg                Code Status: DNAR/Comfort Care  Chin: No urinary catheter in place  Chin Duration (in days):   Central line:    SHANNON:     Discharge is dependent on: course  At this point Mr. Eddy is expected to be discharge to: home    The 21st Century Cures Act makes medical notes like these available to patients in the interest of transparency. Please be advised this is a medical document. Medical documents are  intended to carry relevant information, facts as evident, and the clinical opinion of the practitioner. The medical note is intended as peer to peer communication and may appear blunt or direct. It is written in medical language and may contain abbreviations or verbiage that are unfamiliar.             **Certification      PHYSICIAN Certification of Need for Inpatient Hospitalization - Initial Certification    Patient will require inpatient services that will reasonably be expected to span two midnight's based on the clinical documentation in H+P.   Based on patients current state of illness, I anticipate that, after discharge, patient will require TBD.

## 2024-02-15 NOTE — CDS QUERY
DOCUMENTATION CLARIFICATION FORM  Dear Dr. Mcclain,  Clinical information (provided below) includes documentation of  Malnutrition   PLEASE FURTHER SPECIFY THE DIAGNOSIS OF MALNUTRITION:     [ X   ] Patient meets moderate malnutrition criteria   [    ] Other  (please specify) _________________      Documentation from the Medical Record:   RISK FACTORS:  Dementia - OA - FTT   CLINICAL INDICATORS:   -2/14/24 Dietary Note: NUTRITION ASSESSMENT    Pt meets moderate malnutrition criteria at this time.    CRITERIA FOR MALNUTRITION DIAGNOSIS:  Criteria for non-severe malnutrition diagnosis: chronic illness related to  energy intake less than75% for greater than 1 month, body fat mild depletion,  and muscle mass mild depletion    ANTHROPOMETRICS:  Ht: 172.7 cm (5' 7.99\")  Wt: 62.5 kg (137 lb 11.2 oz).  BMI: Body mass index is 20.94 kg/m².  IBW: 70 kg  -2/15 D/C Summary: Discharge Diagnosis:    FTT  Hypernatremia  Dementia  Malnutrition  Thrombocytopenia    TREATMENT: Dietician consult -  Nutrition education - Medical Food Supplements -        For questions regarding this query, please contact Clinical : Shital Eng -[phone]                                                          THIS FORM IS A PERMANENT PART OF THE MEDICAL RECORD

## 2024-02-15 NOTE — PLAN OF CARE
Assumed care at 0730  A/Oxself, RA, VSS  Tele, NSR/SB  Denies pain   Low sodium, pureed/nectur thick  Critical K, MD aware, replaced per protocol   PIV L upper arm, D5@100mL/hr  Incontinent, brief/chucked   Restraints till 1600  Attempted to contact wife, no response, wasn't given an option to leave voicemail, will attempt to recall  Safety measures in place    Problem: Safety Risk - Non-Violent Restraints  Goal: Patient will remain free from self-harm  Description: INTERVENTIONS:  - Apply the least restrictive restraint to prevent harm  - Notify patient and family of reasons restraints applied  - Assess for any contributing factors to confusion (electrolyte disturbances, delirium, medications)  - Discontinue any unnecessary medical devices as soon as possible  - Assess the patient's physical comfort, circulation, skin condition, hydration, nutrition and elimination needs   - Reorient and redirection as needed  - Assess for the need to continue restraints  Outcome: Progressing     Problem: SAFETY ADULT - FALL  Goal: Free from fall injury  Description: INTERVENTIONS:  - Assess pt frequently for physical needs  - Identify cognitive and physical deficits and behaviors that affect risk of falls.  - [place] fall precautions as indicated by assessment.  - Educate pt/family on patient safety including physical limitations  - Instruct pt to call for assistance with activity based on assessment  - Modify environment to reduce risk of injury  - Provide assistive devices as appropriate  - Consider OT/PT consult to assist with strengthening/mobility  - Encourage toileting schedule  Outcome: Progressing     Problem: METABOLIC/FLUID AND ELECTROLYTES - ADULT  Goal: Electrolytes maintained within normal limits  Description: INTERVENTIONS:  - Monitor labs and rhythm and assess patient for signs and symptoms of electrolyte imbalances  - Administer electrolyte replacement as ordered  - Monitor response to electrolyte replacements,  including rhythm and repeat lab results as appropriate  - Fluid restriction as ordered  - Instruct patient on fluid and nutrition restrictions as appropriate  Outcome: Progressing  Goal: Hemodynamic stability and optimal renal function maintained  Description: INTERVENTIONS:  - Monitor labs and assess for signs and symptoms of volume excess or deficit  - Monitor intake, output and patient weight  - Monitor urine specific gravity, serum osmolarity and serum sodium as indicated or ordered  - Monitor response to interventions for patient's volume status, including labs, urine output, blood pressure (other measures as available)  - Encourage oral intake as appropriate  - Instruct patient on fluid and nutrition restrictions as appropriate  Outcome: Progressing

## 2024-02-15 NOTE — PHYSICAL THERAPY NOTE
PHYSICAL THERAPY EVALUATION - INPATIENT     Room Number: 3610/3610-A  Evaluation Date: 2/15/2024  Type of Evaluation: Initial  Physician Order: PT Eval and Treat    Presenting Problem: abnormal labs, hypernatremia  Co-Morbidities : Dementia, HTN, OA  Reason for Therapy: Mobility Dysfunction and Discharge Planning    PHYSICAL THERAPY ASSESSMENT   Patient is currently functioning below baseline with bed mobility and transfers.  Prior to admission, patient's baseline is indep with supine to sit and sitting EOB, 2 person pivot transfer for bed<>wheelchair, able to propel wc independently.  Patient is requiring dependent as a result of the following impairments: decreased functional strength, decreased endurance/aerobic capacity, pain, impaired sitting balance, decreased muscular endurance, cognitive deficits (dementia), and medical status.  Physical Therapy will continue to follow for duration of hospitalization.    Patient will benefit from continued skilled PT Services to promote return to prior level of function and safety with continuous assistance and gradual rehabilitative therapy . (Trial)    PLAN  PT Treatment Plan: Bed mobility;Body mechanics;Endurance;Energy conservation;Patient education;Family education;Strengthening;Balance training;Transfer training;Neuromuscular re-educate;Range of motion  Rehab Potential : Guarded  Frequency (Obs):  (2-3x/week)  Number of Visits to Meet Established Goals: Trial      CURRENT GOALS    Goal #1 Patient is able to demonstrate supine - sit EOB @ level: minimum assistance     Goal #2 Patient is able to demonstrate transfers EOB to/from Chair/Wheelchair at assistance level: maximum assistance     Goal #3 Patient is able to maintain static sitting balance for 5  minutes with CGA     Goal #4    Goal #5    Goal #6    Goal Comments: Goals established on 2/15/2024      PHYSICAL THERAPY MEDICAL/SOCIAL HISTORY  History related to current admission: Patient is a 78 year old male  admitted on 2024 from [facility] for abnormal labs.  Pt diagnosed with hypernatremia.      HOME SITUATION  Type of Home: Skilled nursing facility ([place])   Home Layout: One level                Lives With: Staff 24 hours  Drives: No          Prior Level of [place]: Pt poor historian, per Judah Mosqueda, pt able to perform supine to sit independently and sit EOB independently. Requires 2 person assist for lateral transfers and assist with adls.     SUBJECTIVE  Pt primarily non verbal during session.       OBJECTIVE  Precautions: Bed/chair alarm  Fall Risk: High fall risk    WEIGHT BEARING RESTRICTION  Weight Bearing Restriction: None                PAIN ASSESSMENT  Ratin          COGNITION  Overall Cognitive Status:  Impaired    RANGE OF MOTION AND STRENGTH ASSESSMENT  See OT note for UE assessment    Lower extremity ROM is as follows:  Right Hip flexion 90-45  Left Hip flexion 90-45  Right Knee 90-45  Left Knee 90-40    Lower extremity strength not tested     BALANCE  Static Sitting: Poor  Dynamic Sitting: Poor  Static Standing: Not tested  Dynamic Standing: Not tested    ADDITIONAL TESTS                                    ACTIVITY TOLERANCE                         O2 WALK       NEUROLOGICAL FINDINGS                        AM-PAC '6-Clicks' INPATIENT SHORT FORM - BASIC MOBILITY  How much difficulty does the patient currently have...  Patient Difficulty: Turning over in bed (including adjusting bedclothes, sheets and blankets)?: Unable   Patient Difficulty: Sitting down on and standing up from a chair with arms (e.g., wheelchair, bedside commode, etc.): Unable   Patient Difficulty: Moving from lying on back to sitting on the side of the bed?: Unable   How much help from another person does the patient currently need...   Help from Another: Moving to and from a bed to a chair (including a wheelchair)?: Total   Help from Another: Need to walk in hospital room?: Total   Help from Another:  Climbing 3-5 steps with a railing?: Total       AM-PAC Score:  Raw Score: 6   Approx Degree of Impairment: 100%   Standardized Score (AM-PAC Scale): 23.55   CMS Modifier (G-Code): CN    FUNCTIONAL ABILITY STATUS  Gait Assessment   Functional Mobility/Gait Assessment  Gait Assistance: Not tested (non ambulatory at baseline)    Skilled Therapy Provided     Bed Mobility:  Rolling: dep  Supine to sit: dep   Sit to supine: dep     Transfer Mobility:  Sit to stand: NT   Stand to sit: NT  Gait = NT    Therapist's comments: RN cleared for session. Pt agreeable for therapy, received supine. Pt transferred from supine to sit with maxA x2, cued pt on reaching towards bed rails to facilitate rolling and supine to sit. Pt with increased stiffness and contractures in BLEs - hips and knees with limited extension. Pt with increased retropulsion, cued on leaning anteriorly. Pt not following commands during session, follows noises 75% of the time (snapping fingers near ears).    Exercise/Education Provided:  Bed mobility  Energy conservation  Functional activity tolerated  Posture  Strengthening    Patient End of Session: In bed;Call light within reach;Needs met;RN aware of session/findings;All patient questions and concerns addressed;Alarm set;Discussed recommendations with /      Patient Evaluation Complexity Level:  History High - 3 or more personal factors and/or co-morbidities   Examination of body systems Moderate - addressing a total of 3 or more elements   Clinical Presentation Moderate - Evolving   Clinical Decision Making Moderate - Evolving       PT Session Time: 20 minutes  Gait Trainin minutes  Therapeutic Activity: 11 minutes

## 2024-02-15 NOTE — CM/SW NOTE
ALONSO received call from Hospitalist regarding POA for pt. Hospitalist stated he spoke with pt's spouse, Krupa, who informed him she was pt's HCPOA, but the HCPOA was 'taken away' from her. Hospitalist stated spouse lives in [place] and has not seen pt in two years.     ALONSO called [facility] liaison Apoorva. Apoorva stated pt has a court appointed guardian named Kiki, who is currently on maternity leave. Apoorva stated pt was on hospice until Fall of 2022 and was taken off hospice since he no longer qualified for hospice services. ALONSO informed Apoorva guardianship contact information and paperwork was not sent with pt on admission, pt only has a spouse and relative listed. ALONSO requested contact for guardian Kiki and guardianship office. Apoorva stated she is waiting for the building to get back to her with the contact information and will provide to ALONSO. ALONSO requested copy of guardianship paperwork to be faxed to the unit at fax: [phone]. Await guardian contact information from [facility].     Addendum (3pm) - ALONSO did not receive faxed guardianship paperwork from [facility]. ALONSO met with Jennifer Oliveros, transitional care coordinator from [facility]. Jennifer stated [facility] stated [facility]'s [facility] at [facility], but when pt was no longer meeting hospice criteria the [facility] pulled funding. Jennifer stated pt's spouse stated she could not assist pt financially and moved to [place]. Jennifer stated pt has been on Family Palliative Care services at the facility and is currently on Medicaid. Jennifer stated she has been unable to contact pt's guardian since she is on maternity leave and has contacted the guardianship office ([phone]) with no answer.     ALONSO called guardianship office ([facility]) [phone] and requested contact information for guardian who is currently following pt's care.  [facility] provided contact information for chidi Boswell ([phone]). Contact information for Sarah Boswell relayed to Hospitalist and Palliative Care APRN. Pt will need to be out of physical/chemical restraints for 24 hours prior to discharge back to [facility]. SW will continue to follow.     Addendum (3:45pm) - SW received call from Jennifre at [facility] stating she received a call from chidi Sarah Boswell. Jennifer stated Sarha is going to sign hospice consents and they will admit pt once he return to the facility. Referral sent to [facility] in [place]. Pt will return to [facility] once he has been out of restraints for 24 hours.     KASEY Wick  Discharge Planner

## 2024-02-15 NOTE — OCCUPATIONAL THERAPY NOTE
OCCUPATIONAL THERAPY EVALUATION - INPATIENT     Room Number: 3610/3610-A  Evaluation Date: 2/15/2024  Type of Evaluation: Initial  Presenting Problem: abnormal labs    Physician Order: IP Consult to Occupational Therapy  Reason for Therapy: ADL/IADL Dysfunction and Discharge Planning    OCCUPATIONAL THERAPY ASSESSMENT   Patient is currently functioning below baseline with eating, bed mobility, transfers, stating sitting balance, and dynamic sitting balance. Prior to admission, patient's baseline is A for all ADLs except self feeding and Mod I for bed mobility and sitting at EOB and Max A x2 to pivot to W/C.  Patient is requiring maximum assist as a result of the following impairments: decreased functional strength, decreased functional reach, decreased endurance, and decreased ability to follow commands . Occupational Therapy will continue to follow for duration of hospitalization.    Patient will benefit from continued skilled OT Services at discharge to promote functional independence and safety with additional support and return home with home health OT at [facility]      History Related to Current Admission: Patient is a 78 year old male admitted on 2/13/2024 with Presenting Problem: abnormal labs. Co-Morbidities : Dementia, HTN, OA    WEIGHT BEARING RESTRICTION  Weight Bearing Restriction: None                Recommendations for nursing staff:   Transfers: total lift  Toileting location: bed level    EVALUATION SESSION:  Patient Start of Session: supine in bed for session  FUNCTIONAL TRANSFER ASSESSMENT  Sit to Stand: Edge of Bed  Edge of Bed: Not Tested (2/2 pt not following commands to sit at EOB)    BED MOBILITY  Rolling: Maximum Assist  Supine to Sit : Maximum Assist  Sit to Supine (OT): Maximum Assist    BALANCE ASSESSMENT  Static Sitting: Moderate Assist    FUNCTIONAL ADL ASSESSMENT  LB Dressing Seated: Maximum Assist (to katelyn socks unable to progress further 2/2 not following commands)      ACTIVITY TOLERANCE:  vitals stable                         O2 SATURATIONS       COGNITION  Overall Cognitive Status:  Impaired and unable to follow commands    Upper Extremity   ROM: within functional limits   Strength: within functional limits   Coordination  Gross motor: WNl  Fine motor: WNL  Sensation: Light touch:  intact    EDUCATION PROVIDED  Patient: Role of Occupational Therapy; Plan of Care  Patient's Response to Education: Demonstrates Poor Carry Over to Information; Requires Further Education    Equipment used: TBD  Demonstrates functional use, Would benefit from additional trial          Patient End of Session: Needs met;In bed;All patient questions and concerns addressed;SCDs in place;Alarm set    OCCUPATIONAL PROFILE    HOME SITUATION  Type of Home: Skilled nursing facility ([place])  Home Layout: One level  Lives With: Staff 24 hours                     Drives: No       Prior Level of Function: Prior to admission, patient's baseline is A for all ADLs except self feeding and Mod I for bed mobility and sitting at EOB and Max A x2 to pivot to W/C.     SUBJECTIVE   Pt stated, \"I am doing well.\"    PAIN ASSESSMENT  Rating: Unable to rate  Location: unknown       OBJECTIVE  Precautions: Bed/chair alarm  Fall Risk: High fall risk      ASSESSMENTS    AM-PAC ‘6-Clicks’ Inpatient Daily Activity Short Form  -   Putting on and taking off regular lower body clothing?: A Lot  -   Bathing (including washing, rinsing, drying)?: A Lot  -   Toileting, which includes using toilet, bedpan or urinal? : A Lot  -   Putting on and taking off regular upper body clothing?: A Lot  -   Taking care of personal grooming such as brushing teeth?: A Lot  -   Eating meals?: A Lot    AM-PAC Score:  Score: 12  Approx Degree of Impairment: 66.57%  Standardized Score (AM-PAC Scale): 30.6    ADDITIONAL TESTS     NEUROLOGICAL FINDINGS      COGNITION ASSESSMENTS       PLAN  OT Treatment Plan: Balance activities;Energy conservation/work simplification  techniques;ADL training;IADL training;Continued evaluation;Compensatory technique education;Equipment eval/education;Patient/Family training;Patient/Family education;UE strengthening/ROM;Endurance training;Functional transfer training  Rehab Potential : Guarded  Frequency: 3x/week  Number of Visits to Meet Established Goals: 3;Trial    ADL Goals   Patient will perform eating: with set up    Functional Transfer Goals  Patient will transfer in bed by rolling:  with min assist  Patient will transfer from sit to supine:  with min assist  Patient will transfer from supine to sit:  with min assist    Patient Evaluation Complexity Level:   Occupational Profile/Medical History MODERATE - Expanded review of history including review of medical or therapy record   Specific performance deficits impacting engagement in ADL/IADL MODERATE  3 - 5 performance deficits   Client Assessment/Performance Deficits MODERATE - Comorbidities and min to mod modifications of tasks    Clinical Decision Making MODERATE - Analysis of occupational profile, detailed assessments, several treatment options    Overall Complexity MODERATE     OT Session Time: 20 minutes  Self-Care Home Management: 0 minutes  Therapeutic Activity: 10 minutes  Neuromuscular Re-education: 0 minutes  Therapeutic Exercise: 0 minutes  Cognitive Skills: 0 minutes  Sensory Integrative: 0 minutes  Orthotic Management and Trainin minutes  Can add/delete any of these

## 2024-02-16 VITALS
OXYGEN SATURATION: 96 % | WEIGHT: 137.69 LBS | SYSTOLIC BLOOD PRESSURE: 141 MMHG | BODY MASS INDEX: 20.87 KG/M2 | DIASTOLIC BLOOD PRESSURE: 62 MMHG | RESPIRATION RATE: 16 BRPM | HEART RATE: 46 BPM | TEMPERATURE: 98 F | HEIGHT: 67.99 IN

## 2024-02-16 LAB
ALBUMIN SERPL-MCNC: 2 G/DL (ref 3.4–5)
ALBUMIN/GLOB SERPL: 0.7 {RATIO} (ref 1–2)
ALP LIVER SERPL-CCNC: 61 U/L
ALT SERPL-CCNC: 17 U/L
ANION GAP SERPL CALC-SCNC: 0 MMOL/L (ref 0–18)
AST SERPL-CCNC: 19 U/L (ref 15–37)
BILIRUB SERPL-MCNC: 0.7 MG/DL (ref 0.1–2)
BUN BLD-MCNC: 16 MG/DL (ref 9–23)
CALCIUM BLD-MCNC: 7.4 MG/DL (ref 8.5–10.1)
CHLORIDE SERPL-SCNC: 121 MMOL/L (ref 98–112)
CO2 SERPL-SCNC: 27 MMOL/L (ref 21–32)
CREAT BLD-MCNC: 0.72 MG/DL
EGFRCR SERPLBLD CKD-EPI 2021: 94 ML/MIN/1.73M2 (ref 60–?)
ERYTHROCYTE [DISTWIDTH] IN BLOOD BY AUTOMATED COUNT: 13.2 %
GLOBULIN PLAS-MCNC: 2.9 G/DL (ref 2.8–4.4)
GLUCOSE BLD-MCNC: 90 MG/DL (ref 70–99)
HCT VFR BLD AUTO: 38.2 %
HGB BLD-MCNC: 12.5 G/DL
MAGNESIUM SERPL-MCNC: 2.2 MG/DL (ref 1.6–2.6)
MCH RBC QN AUTO: 33.9 PG (ref 26–34)
MCHC RBC AUTO-ENTMCNC: 32.7 G/DL (ref 31–37)
MCV RBC AUTO: 103.5 FL
OSMOLALITY SERPL CALC.SUM OF ELEC: 307 MOSM/KG (ref 275–295)
PHOSPHATE SERPL-MCNC: 2.7 MG/DL (ref 2.5–4.9)
PLATELET # BLD AUTO: 93 10(3)UL (ref 150–450)
POTASSIUM SERPL-SCNC: 3.3 MMOL/L (ref 3.5–5.1)
PROT SERPL-MCNC: 4.9 G/DL (ref 6.4–8.2)
RBC # BLD AUTO: 3.69 X10(6)UL
SODIUM SERPL-SCNC: 148 MMOL/L (ref 136–145)
WBC # BLD AUTO: 7 X10(3) UL (ref 4–11)

## 2024-02-16 PROCEDURE — 99239 HOSP IP/OBS DSCHRG MGMT >30: CPT | Performed by: HOSPITALIST

## 2024-02-16 NOTE — PLAN OF CARE
Assumed care at 1930  Alert to self  Room air  NSR/ SB HR in 50s on tele  Non cardiac electrolyte protocol  Lovenox for VTE  2GM NA restriction diet, pureed, nectar thick  Purewick, Chucked & briefed  PIV L upper arm removed by Pt, MD Aware  Call light within reach, safety precautions in place  All needs met at this time    Problem: SAFETY ADULT - FALL  Goal: Free from fall injury  Description: INTERVENTIONS:  - Assess pt frequently for physical needs  - Identify cognitive and physical deficits and behaviors that affect risk of falls.  - [place] fall precautions as indicated by assessment.  - Educate pt/family on patient safety including physical limitations  - Instruct pt to call for assistance with activity based on assessment  - Modify environment to reduce risk of injury  - Provide assistive devices as appropriate  - Consider OT/PT consult to assist with strengthening/mobility  - Encourage toileting schedule  Outcome: Progressing     Problem: METABOLIC/FLUID AND ELECTROLYTES - ADULT  Goal: Electrolytes maintained within normal limits  Description: INTERVENTIONS:  - Monitor labs and rhythm and assess patient for signs and symptoms of electrolyte imbalances  - Administer electrolyte replacement as ordered  - Monitor response to electrolyte replacements, including rhythm and repeat lab results as appropriate  - Fluid restriction as ordered  - Instruct patient on fluid and nutrition restrictions as appropriate  Outcome: Progressing     Problem: METABOLIC/FLUID AND ELECTROLYTES - ADULT  Goal: Hemodynamic stability and optimal renal function maintained  Description: INTERVENTIONS:  - Monitor labs and assess for signs and symptoms of volume excess or deficit  - Monitor intake, output and patient weight  - Monitor urine specific gravity, serum osmolarity and serum sodium as indicated or ordered  - Monitor response to interventions for patient's volume status, including labs, urine output, blood pressure (other  measures as available)  - Encourage oral intake as appropriate  - Instruct patient on fluid and nutrition restrictions as appropriate  Outcome: Progressing

## 2024-02-16 NOTE — CM/SW NOTE
02/16/24 1102   Discharge disposition   Expected discharge disposition [facility]   Post Acute Care Provider Sudeep Juarez   Additional Home Care/Hospice Provider   ([facility])   Discharge transportation [facility]     [facility] arranged for 12pm to [facility]. Updated RN and Sudeep liadominic Guerin. RN to update pt's guardian Sarah Boswell ([phone]). PCS form completed and available for RN to print.      [facility] Phone [phone]    [facility]  [phone]    KASEY Wick  Discharge Planner

## 2024-02-16 NOTE — DISCHARGE INSTRUCTIONS
Diet Recommendations - Solids: Puree  Diet Recommendations - Liquids: Nectar thick liquids/ Mildly thick

## 2024-02-16 NOTE — CM/SW NOTE
Pt discussed in rounds and per RN, pt was placed in restraints for two hours overnight due to pulling at his IV. ALONSO spoke with [employer] Guerin and inquired if pt is still able to return to [facility] today. Pt will be admitted to [facility] at [facility]. Apoorva stated she will speak to the DON at [facility] and reach out to SW.     Addendum (10:50am) - ALONSO received call from Sudeep Guerin inquiring if pt still has an IV. ALONSO spoke with RN who stated pt does not have an IV. Apoorva stated pt is able to return to [facility] today despite not being out of restraints for 24 hours prior to discharge. Apoorva requested a 12pm p/u time for transport. Updated RN.     Jacqueline Perrin, GRACEW  Discharge Planner

## 2024-02-16 NOTE — PROGRESS NOTES
NURSING DISCHARGE NOTE    Discharged Nursing home via Ambulance.  Accompanied by Support staff  Belongings Taken by patient/family.Attempted report to nursing home. No answer.

## (undated) NOTE — IP AVS SNAPSHOT
[address]            (For Outpatient Use Only) Initial Admit Date: 3/23/2022   Inpt/Obs Admit Date: Inpt: 3/24/22 / Obs: N/A   Discharge Date:    Olman Steele:  [de-identified]   MRN: [de-identified]   CSN: [id number]   CEID: [id number]        ENCOUNTER  Patient Class: Inpatient Admitting Provider: Anni Carmona MD Unit: [address] Service: Medical Attending Provider: Hammad Ronquillo DO   Bed: 332-A   Visit Type:   Referring Physician: No ref. provider found Billing Flag:    Admit Diagnosis: Urinary retention [R33.9]      PATIENT  Legal Name:   Ghazal Connell   Legal Sex: Male  Gender ID:              [address] [facility] Name:    PCP:  Claudell Don, MD Home: [phone]   [facility]   [facility] [address]  : 1945 (76 yrs) Mobile: [phone]         City[facility]: [address] [address] Marital:  Language: [address] [place] [address] Drive: Zoomy N4: [id number] Religious: [address]*     Race: White Ethnicity: Non  Or [address] [place] [address]   Name Relationship Legal Guardian? Home Phone Work Phone Mobile Phone   1. Krupa Eddy  2. [facility] Spouse  Relative    [address]   [phone]     GUARANTOR  Guarantor: Fatemeh Warner : 1945 Home Phone: [phone]   [facility]  [facility]  [address]   Sex:  Male Work Phone:    [facility], [address] [place] [address]   Rel. to Patient: Self Guarantor ID: [id number]   [address]   Employer:  Status: RETIRED     COVERAGE  PRIMARY INSURANCE   Payor: MEDICARE Plan: MEDICARE PART A ONLY   Group Number:  Insurance Type: INDEMNITY   Subscriber Name: Елена Poole : 1945   Subscriber ID: [id number] Pt Rel to Subscriber: Self   SECONDARY INSURANCE   Payor: DORI Plan: [facility] OPTUM   Group Number: [id number] Insurance Type: INDEMNITY   Subscriber Name: Fatemeh Warner Subscriber : 1945   Subscriber ID: [id number] Pt Rel to Subscriber: SELF   TERTIARY INSURANCE   Payor:  Plan:    Group Number:  Insurance Type:    Subscriber Name:  Subscriber :    Subscriber ID:  Pt Rel to Subscriber:    Hospital Account Financial Class: Medicare    2022

## (undated) NOTE — IP AVS SNAPSHOT
[address]            (For Outpatient Use Only) Initial Admit Date: 3/29/2022   Inpt/Obs Admit Date: Inpt: 3/29/22 / Obs: 22   Discharge Date:    Hospital Acct:  [de-identified]   MRN: [de-identified]   CSN: [id number]   CEID: [id number]        ENCOUNTER  Patient Class: Observation Admitting Provider: Vaughn Figueroa DO Unit: [address] [facility] Service: Cardiac Telemetry Attending Provider: Vaughn Figueroa DO   Bed: 5737-X   Visit Type:   Referring Physician: No ref. provider found Billing Flag:    Admit Diagnosis: Intracranial hemorrhage [facility]) [I62.9]      PATIENT  Legal Name:   Brittany Spear   Legal Sex: Male  Gender ID:              Pref Name:    PCP:  Maite Terrell MD Home: [phone]   Address:   [facility]  [address]  : 1945 (77 yrs) Mobile: [phone]         City/[facility]: [facility] [place] [facility] [address] Marital:  Language: Chaparro Perezhead: Sheila SSN4: [id number] Yazidi: [address] Not [address]andra*     Race: White Ethnicity: Non  Or [address] [facility] [address]   Name Relationship Legal Guardian? Home Phone Work Phone Mobile Phone   1. Krupa Eddy  2. [facility] Spouse  Relative    [address]   [phone]     GUARANTOR  Guarantor: Lencho Parker : 1945 Home Phone: [phone]   Address:  [facility]  [address]   Sex:  Male Work Phone:    City/[facility], [address] [place] [facility]   Rel. to Patient: Self Guarantor ID: [id number]   [address]   Employer:  Status: RETIRED     COVERAGE  PRIMARY INSURANCE   Payor: [employer] Plan: [address] [place] [address] Road   Group Number: [id number] Insurance Type: [id number] Name: Piper Putnam : 1945   Subscriber ID: [id number] Pt Rel to Subscriber: Self   SECONDARY INSURANCE   Payor: MEDICARE Plan: MEDICARE PART A ONLY   Group Number:  Insurance Type: [id number] Name: Piper Putnam : 1945   Subscriber ID: [id number] Pt Rel to Subscriber: SELF   TERTIARY INSURANCE   Payor:  Plan:    Group Number:  Insurance Type:    Subscriber Name:  Subscriber :    Subscriber ID:  Pt Rel to Subscriber:    Hospital Account Financial Class:     2022

## (undated) NOTE — IP AVS SNAPSHOT
Patient Demographics     Address  [address]  [place] [address] Phone  [phone] (Home)  [phone] (Mobile) *Preferred*      Patient Contacts     Name Relation Home Work Mobile    Krupa Eddy Spouse [phone]    Jero Bustamante Relative [phone]      Allergies as of 2/16/2024  Review status set to In Progress on 2/13/2024       Noted Reaction Type Reactions    Aricept [donepezil] 03/23/2022    UNKNOWN    Latex 08/27/2008    RASH      Code Status Information     Code Status    DNAR/Comfort Care        Patient Instructions       Diet Recommendations - Solids: Puree  Diet Recommendations - Liquids: Nectar thick liquids/ Mildly thick     Follow-up Information     Lai Lew MD. Schedule an appointment as soon as possible for a visit in 1 week(s).    Specialties: Internal Medicine, IP Consult to Primary Care  Contact information:  [address] [id number] [phone]                        Your Home Meds List      TAKE these medications       Instructions Authorizing Provider Morning Afternoon Evening As Needed   acetaminophen 325 MG Tabs  Commonly known as: Tylenol  Next dose due: As directed      Take 2 tablets (650 mg total) by mouth every 4 (four) hours as needed for Pain or Fever.          finasteride 5 MG Tabs  Commonly known as: Proscar  Next dose due: Tomorrow 2/17      Take 1 tablet (5 mg total) by mouth daily.          loperamide 1 MG/7.5ML Soln  Commonly known as: Imodium  Next dose due: As directed      Take 30 mL (4 mg total) by mouth 4 (four) times daily as needed for Diarrhea.          melatonin 3 MG Tabs  Next dose due: Tonight 2/16      Take 2 tablets (6 mg total) by mouth nightly.          ondansetron 4 MG Tbdp  Commonly known as: Zofran-ODT  Next dose due: As directed      Take 1 tablet (4 mg total) by mouth every 6 (six) hours as needed for Nausea.          senna-docusate 8.6-50 MG Tabs  Commonly known as:  Senokot-S  Next dose due: Tomorrow 2/17      Take 1 tablet by mouth daily.          tamsulosin 0.4 MG Caps  Commonly known as: Flomax  Next dose due: Tomorrow 2/17      Take 2 capsules (0.8 mg total) by mouth daily. 2 tablets daily          Valproic Acid 250 MG/5ML Soln  Next dose due: Tonight 2/16      Take 10 mL by mouth in the morning and 10 mL at noon and 10 mL in the evening.                   [id number] - MAR ACTION REPORT  (last 48 hrs)    ** SITE UNKNOWN **     Order ID Medication Name Action Time Action Reason Comments    [id number] finasteride (Proscar) tab 5 mg 02/15/24 0853 Given      [id number] haloperidol lactate (Haldol) 5 MG/ML injection 5 mg 02/15/24 0244 Given      [id number] potassium chloride 10 mEq in dextrose 5%-sodium chloride 0.45% 1000mL infusion premix 02/15/24 1245 New Bag      [id number] potassium phosphate dibasic 15 mmol in sodium chloride 0.9% 250 mL IVPB (Followed by [facility] #1) 02/15/24 0949 New Bag      [id number] tamsulosin (Flomax) cap 0.8 mg 02/15/24 0853 Given      [id number] valproic acid (Depakene) 250 MG/5ML oral solution 500 mg 02/14/24 1700 Given      [id number] valproic acid (Depakene) 250 MG/5ML oral solution 500 mg 02/15/24 0614 Given      [id number] valproic acid (Depakene) 250 MG/5ML oral solution 500 mg 02/15/24 1250 Given      [id number] valproic acid (Depakene) 250 MG/5ML oral solution 500 mg 02/15/24 1750 Given      [id number] valproic acid (Depakene) 250 MG/5ML oral solution 500 mg 02/16/24 0554 Given            LEFT LOWER ABDOMEN     Order ID Medication Name Action Time Action Reason Comments    [id number] enoxaparin (Lovenox) 40 MG/0.4ML SUBQ injection 40 mg 02/16/24 1024 Given            RIGHT LOWER ABDOMEN     Order ID Medication Name Action Time Action Reason Comments    [id number] enoxaparin (Lovenox) 40 MG/0.4ML SUBQ injection 40 mg 02/15/24 0853 Given              Recent Vital Signs    Flowsheet Row Most Recent Value   /62 Filed at 02/16/2024 0800   Pulse  46 Filed at 02/16/2024 0800   Resp 16 Filed at 02/16/2024 0800   Temp 97.7 °F (36.5 °C) Filed at 02/16/2024 0800   SpO2 96 % Filed at 02/16/2024 0800      Patient's Most Recent Weight    Flowsheet Row Most Recent Value   Patient Weight 62.5 kg (137 lb 11.2 oz)         Lab Results Last 24 Hours      Comp Metabolic Panel (14) [[id number]] (Abnormal)  Resulted: 02/16/24 0655, Result status: Final result   Ordering provider: Christo Mcclain MD  02/15/24 2300 Resulting lab: [facility] ([facility])    Specimen Information    Type Source Collected On   Blood — 02/16/24 0607          Components    Component Value Reference Range Flag Lab   Glucose 90 70 - 99 mg/dL — [facility])   Sodium 148 136 - 145 mmol/L [facility])   Potassium 3.3 3.5 - 5.1 mmol/L L [facility])   Chloride 121 98 - 112 mmol/L [facility]   CO2 27.0 21.0 - 32.0 mmol/L — [facility])   Anion Gap 0 0 - 18 mmol/L — [facility])   BUN 16 9 - 23 mg/dL — [facility])   Creatinine 0.72 0.70 - 1.30 mg/dL — [facility]   Calcium, Total 7.4 8.5 - 10.1 mg/dL L [facility])   Calculated Osmolality 307 275 - 295 mOsm/kg H [facility])   eGFR-Cr 94 >=60 mL/min/1.73m2 — [facility])   AST 19 15 - 37 U/L — [facility])   ALT 17 16 - 61 U/L — [facility])   Alkaline Phosphatase 61 45 - 117 U/L — [facility])   Bilirubin, Total 0.7 0.1 - 2.0 mg/dL — [facility])   Total Protein 4.9 6.4 - 8.2 g/dL L [facility])   Albumin 2.0 3.4 - 5.0 g/dL L [facility])   Globulin  2.9 2.8 - 4.4 g/dL — [facility])   A/G Ratio 0.7 1.0 - 2.0 L [facility])            Magnesium [[id number]] (Normal)  Resulted: 02/16/24 0655, Result status: Final result   Ordering provider: Christo Mcclain MD  02/15/24 2308 Resulting lab: [facility] ([facility])    Specimen Information    Type Source Collected On   Blood — 02/16/24 0607          Components    Component Value Reference  Range Flag Lab   Magnesium 2.2 1.6 - 2.6 mg/dL — [facility])            Phosphorus [[id number]] (Normal)  Resulted: 02/16/24 0655, Result status: Final result   Ordering provider: Christo Mcclain MD  02/15/24 2300 Resulting lab: [facility] ([facility])    Specimen Information    Type Source Collected On   Blood — 02/16/24 0607          Components    Component Value Reference Range Flag Lab   Phosphorus 2.7 2.5 - 4.9 mg/dL — [facility])            Scan slide [[id number]]  Resulted: 02/16/24 0650, Result status: Final result   Ordering provider: Christo Mcclain MD  02/16/24 0650 Resulting lab: [facility] ([facility])    Specimen Information    Type Source Collected On   Blood — 02/16/24 0607          Components    Component Value Reference Range Flag Lab   Slide Review Platelets reviewed on smear. No giant platelets or platelet clumps observed. — — [facility])            CBC, Platelet; No Differential [[id number]] (Abnormal)  Resulted: 02/16/24 0650, Result status: Final result   Ordering provider: Christo Mcclain MD  02/15/24 2300 Resulting lab: [facility] ([facility])    Specimen Information    Type Source Collected On   Blood — 02/16/24 0607          Components    Component Value Reference Range Flag Lab   WBC 7.0 4.0 - 11.0 x10(3) uL — [facility])   RBC 3.69 3.80 - 5.80 x10(6)uL L [facility])   HGB 12.5 13.0 - 17.5 g/dL L [facility])   HCT 38.2 39.0 - 53.0 % L [facility])   PLT 93.0 150.0 - 450.0 10(3)uL L [facility])   .5 80.0 - 100.0 fL H [facility])   MCH 33.9 26.0 - 34.0 pg — [facility])   MCHC 32.7 31.0 - 37.0 g/dL — [facility])   RDW 13.2 % — [facility] ([facility])            Potassium [[id number]] (Abnormal)  Resulted: 02/15/24 2245, Result status: Final result   Ordering provider: Christo Mcclain MD  02/15/24 0821 Resulting lab: [facility] ([facility])    Specimen Information     Type Source Collected On   Blood — 02/15/24 2112          Components    Component Value Reference Range Flag Lab   Potassium 3.2 3.5 - 5.1 mmol/L L [facility] ([facility])            Testing Performed By     Lab - Abbreviation Name Director Address Valid Date Range    139 - [facility] ([facility]) [facility] ([facility]) Goldberg, Cathryn A. MD [address] [place] [address] 20 1441 - Present            Microbiology Results (All)     None         H&P - H&P Note      H&P signed by Tyra Neri MD at 2024  8:26 PM  Version 2 of 2    Author: Tyra Neri MD Service: — Author Type: Physician    Filed: 2024  8:26 PM Date of Service: 2024  7:38 PM Status: Addendum    : yTra Neri MD (Physician)    Related Notes: Original Note by Tyra Neri MD (Physician) filed at 2024  8:24 PM         [facility]     Tommie Eddy Patient Status:  Emergency    1945 MRN [id number]   Location [facility] EMERGENCY DEPARTMENT Attending Carlos Melgar MD   Hosp Day # 0 PCP Lai Lew MD     Chief Complaint: abnormal labs     Subjective:    History of Present Illness:     Tommie Eddy is a 78 year old male with  h/o dementia, HTN, OA. Pt is presenting with abnormal Na- history limited due to patient factors.    History/Other:    Past Medical History:  Past Medical History:   Diagnosis Date    Dementia (HCC)     Essential hypertension     HYPERLIPIDEMIA     OBESITY     OSTEOARTHRITIS      Past Surgical History:   Past Surgical History:   Procedure Laterality Date    EXPLOR ANKLE JOINT  1986    HERNIA SURGERY      TONSILLECTOMY        Family History:   Family History   Problem Relation Age of Onset    Diabetes Father     Cancer Mother         breast     Social History:    reports that he quit smoking about 38 years ago. He has a 20 pack-year smoking history. He does not have any smokeless tobacco history on file. He reports current  alcohol use of about 5.8 standard drinks of alcohol per week. He reports that he does not use drugs.     Allergies:   Allergies   Allergen Reactions    Aricept [Donepezil] UNKNOWN    Latex RASH       Medications:    No current facility-administered medications on file prior to encounter.     Current Outpatient Medications on File Prior to Encounter   Medication Sig Dispense Refill    enoxaparin 40 MG/0.4ML Subcutaneous Solution Inject 0.4 mL (40 mg total) into the skin daily.  0    acetaminophen 325 MG Oral Tab Take 650 mg by mouth every 8 (eight) hours as needed for Pain.      ammonium lactate 12 % External Lotion Apply topically daily.      Cholecalciferol 25 MCG (1000 UT) Oral Tab Take 1,000 Units by mouth daily.      QUEtiapine 25 MG Oral Tab Take 0.5 tablets (12.5 mg total) by mouth nightly.  0    melatonin 3 MG Oral Tab Take 6 mg by mouth nightly.      polyethylene glycol, PEG 3350, 17 g Oral Powd Pack Take 17 g by mouth daily.      finasteride 5 MG Oral Tab Take 5 mg by mouth daily.      tamsulosin (FLOMAX) cap Take 0.8 mg by mouth daily. 2 tablets daily         Review of Systems:   A comprehensive review of systems could not be completed.    Pertinent positives and negatives noted in the HPI.    Objective:   Physical Exam:    /74   Pulse 63   Temp 98 °F (36.7 °C) (Temporal)   Resp 26   SpO2 100%   General: not alert  Respiratory: No rhonchi, no wheezes  Cardiovascular: S1, S2. Regular rate and rhythm  Abdomen: Soft,  non-distended, positive bowel sounds  Neuro: unable to assess   Extremities: No edema      Results:    Labs:      Labs Last 24 Hours:    Recent Labs   Lab 02/13/24  1839   RBC 3.86   HGB 13.1   HCT 42.3   .6*   MCH 33.9   MCHC 31.0   RDW 13.8   NEPRELIM 3.70   WBC 6.3   .0*       Recent Labs   Lab 02/13/24  1839   GLU 94   BUN 48*   CREATSERUM 1.28   EGFRCR 58*   CA 8.4*   ALB 2.5*   *   K 3.8   *   CO2 23.0   ALKPHO 61   AST 27   ALT 20   BILT 0.9   TP 5.8*        Lab Results   Component Value Date    INR 1.06 2022       No results for input(s): \"TROP\", \"TROPHS\", \"CK\" in the last 168 hours.    No results for input(s): \"TROP\", \"PBNP\" in the last 168 hours.    No results for input(s): \"PCT\" in the last 168 hours.    Imaging: Imaging data reviewed in Epic.    Assessment & Plan:      #HyperNatremia likely due to dementia/poor oral intake  IVF  Repeat in AM  #Dementia   #Malnutrition  Due to dementia    #Thrombocytopenia  #GOC- need to verify code status     MED rec pending       Plan of care discussed with Dr Ramón Neri MD    Supplementary Documentation:     The  Century Cures Act makes medical notes like these available to patients in the interest of transparency. Please be advised this is a medical document. Medical documents are intended to carry relevant information, facts as evident, and the clinical opinion of the practitioner. The medical note is intended as peer to peer communication and may appear blunt or direct. It is written in medical language and may contain abbreviations or verbiage that are unfamiliar.                                   Electronically signed by Tyra Neri MD on 2024  8:26 PM     H&P signed by Tyra Neri MD at 2024  8:24 PM  Version 1 of 2    Author: Tyra Neri MD Service: — Author Type: Physician    Filed: 2024  8:24 PM Date of Service: 2024  7:38 PM Status: Signed    : Tyra Neri MD (Physician)    Related Notes: Addendum by Tyra Neri MD (Physician) filed at 2024  8:26 PM         [facility]     Tommie Eddy Patient Status:  Emergency    1945 MRN [id number]   Location [facility] EMERGENCY DEPARTMENT Attending Carlos Melgar MD   Hosp Day # 0 PCP Lai Lew MD     Chief Complaint: abnormal labs     Subjective:    History of Present Illness:     Tommie Eddy is a 78 year old male with  h/o dementia, HTN, OA. Pt is  presenting with abnormal Na- history limited due to patient factors.    History/Other:    Past Medical History:  Past Medical History:   Diagnosis Date    Dementia (HCC)     Essential hypertension     HYPERLIPIDEMIA     OBESITY     OSTEOARTHRITIS      Past Surgical History:   Past Surgical History:   Procedure Laterality Date    EXPLOR ANKLE JOINT  1986    HERNIA SURGERY      TONSILLECTOMY        Family History:   Family History   Problem Relation Age of Onset    Diabetes Father     Cancer Mother         breast     Social History:    reports that he quit smoking about 38 years ago. He has a 20 pack-year smoking history. He does not have any smokeless tobacco history on file. He reports current alcohol use of about 5.8 standard drinks of alcohol per week. He reports that he does not use drugs.     Allergies:   Allergies   Allergen Reactions    Aricept [Donepezil] UNKNOWN    Latex RASH       Medications:    No current facility-administered medications on file prior to encounter.     Current Outpatient Medications on File Prior to Encounter   Medication Sig Dispense Refill    enoxaparin 40 MG/0.4ML Subcutaneous Solution Inject 0.4 mL (40 mg total) into the skin daily.  0    acetaminophen 325 MG Oral Tab Take 650 mg by mouth every 8 (eight) hours as needed for Pain.      ammonium lactate 12 % External Lotion Apply topically daily.      Cholecalciferol 25 MCG (1000 UT) Oral Tab Take 1,000 Units by mouth daily.      QUEtiapine 25 MG Oral Tab Take 0.5 tablets (12.5 mg total) by mouth nightly.  0    melatonin 3 MG Oral Tab Take 6 mg by mouth nightly.      polyethylene glycol, PEG 3350, 17 g Oral Powd Pack Take 17 g by mouth daily.      finasteride 5 MG Oral Tab Take 5 mg by mouth daily.      tamsulosin (FLOMAX) cap Take 0.8 mg by mouth daily. 2 tablets daily         Review of Systems:   A comprehensive review of systems could not be completed.    Pertinent positives and negatives noted in the HPI.    Objective:   Physical  Exam:    /74   Pulse 63   Temp 98 °F (36.7 °C) (Temporal)   Resp 26   SpO2 100%   General: not alert  Respiratory: No rhonchi, no wheezes  Cardiovascular: S1, S2. Regular rate and rhythm  Abdomen: Soft,  non-distended, positive bowel sounds  Neuro: unable to assess   Extremities: No edema      Results:    Labs:      Labs Last 24 Hours:    Recent Labs   Lab 02/13/24  1839   RBC 3.86   HGB 13.1   HCT 42.3   .6*   MCH 33.9   MCHC 31.0   RDW 13.8   NEPRELIM 3.70   WBC 6.3   .0*       Recent Labs   Lab 02/13/24  1839   GLU 94   BUN 48*   CREATSERUM 1.28   EGFRCR 58*   CA 8.4*   ALB 2.5*   *   K 3.8   *   CO2 23.0   ALKPHO 61   AST 27   ALT 20   BILT 0.9   TP 5.8*       Lab Results   Component Value Date    INR 1.06 03/29/2022       No results for input(s): \"TROP\", \"TROPHS\", \"CK\" in the last 168 hours.    No results for input(s): \"TROP\", \"PBNP\" in the last 168 hours.    No results for input(s): \"PCT\" in the last 168 hours.    Imaging: Imaging data reviewed in Epic.    Assessment & Plan:      #HyperNatremia likely due to dementia/poor oral intake  IVF  Repeat in AM  #Dementia   #Malnutrition  Due to dementia    #Thrombocytopenia  #GOC- need to verify code status         Plan of care discussed with Dr Ramón Neri MD    Supplementary Documentation:     The 21st Century Cures Act makes medical notes like these available to patients in the interest of transparency. Please be advised this is a medical document. Medical documents are intended to carry relevant information, facts as evident, and the clinical opinion of the practitioner. The medical note is intended as peer to peer communication and may appear blunt or direct. It is written in medical language and may contain abbreviations or verbiage that are unfamiliar.                                   Electronically signed by Tyra Neri MD on 2/13/2024  8:24 PM              Discharge Summary - D/C Summary      Discharge  Summary signed by Christo Mcclain MD at 2/15/2024  3:26 PM  Version 1 of 1    Author: Christo Mcclain MD Service: — Author Type: Physician    Filed: 2/15/2024  3:26 PM Date of Service: 2/15/2024  3:23 PM Status: Signed    : Christo Mcclain MD (Physician)       [facility]     Tommie Eddy Patient Status:  Inpatient    1945 MRN [id number]   Location [facility] 3NE-A Attending Christo Mcclain MD   Hosp Day # 2 PCP Lai Lew MD     Date of Admission: 2024  Date of Discharge:   2/15/24    Discharge Disposition: NH w/ hospice    Discharge Diagnosis:  FTT  Hypernatremia  Dementia  Malnutrition  Thrombocytopenia    History of Present Illness: Tommie Eddy is a 78 year old male with  h/o dementia, HTN, OA. Pt is presenting with abnormal Na- history limited due to patient factors.       Brief Synopsis: pt brought to hospital for high Na. Pt severely dehydrated, refusing to eat. Very confused. Reportedly, pt used to be in hospice, is unclear why he was taken out. D/w wife who states she lost guardianship but agrees pt should be in hospice. Was able to find the state appted guardian who also agrees pt should be hospice. Pt seems to be declining rapidly and is more than appropriate for hospice at this time. Will DC back to NH w/ hospice. The guardian will reach out to the pt's previous hospice company to resume care.     Lace+ Score: 61  59-90 High Risk  29-58 Medium Risk  0-28   Low Risk       TCM Follow-Up Recommendation:  LACE > 58: High Risk of readmission after discharge from the hospital.      Procedures during hospitalization:   none    Incidental or significant findings and recommendations (brief descriptions):  none    Lab/Test results pending at Discharge:   none    Consultants:  palliaitive    Discharge Medication List:     Discharge Medications        CONTINUE taking these medications        Instructions Prescription details   acetaminophen 325 MG  Tabs  Commonly known as: Tylenol      Take 2 tablets (650 mg total) by mouth every 4 (four) hours as needed for Pain or Fever.   Refills: 0     enoxaparin 40 MG/0.4ML Sosy  Commonly known as: Lovenox      Inject 0.4 mL (40 mg total) into the skin daily.   Refills: 0     finasteride 5 MG Tabs  Commonly known as: Proscar      Take 1 tablet (5 mg total) by mouth daily.   Refills: 0     loperamide 1 MG/7.5ML Soln  Commonly known as: Imodium      Take 30 mL (4 mg total) by mouth 4 (four) times daily as needed for Diarrhea.   Refills: 0     melatonin 3 MG Tabs      Take 2 tablets (6 mg total) by mouth nightly.   Refills: 0     ondansetron 4 MG Tbdp  Commonly known as: Zofran-ODT      Take 1 tablet (4 mg total) by mouth every 6 (six) hours as needed for Nausea.   Refills: 0     senna-docusate 8.6-50 MG Tabs  Commonly known as: Senokot-S      Take 1 tablet by mouth daily.   Refills: 0     tamsulosin 0.4 MG Caps  Commonly known as: Flomax      Take 2 capsules (0.8 mg total) by mouth daily. 2 tablets daily   Refills: 0     Valproic Acid 250 MG/5ML Soln      Take 10 mL by mouth in the morning and 10 mL at noon and 10 mL in the evening.   Refills: 0              [place]    Follow-up appointment:   No follow-up provider specified.  Appointments for Next 30 Days 2/15/2024 - 3/16/2024      None            Vital signs:  Temp:  [97.1 °F (36.2 °C)-97.8 °F (36.6 °C)] 97.3 °F (36.3 °C)  Pulse:  [50-64] 61  Resp:  [17-20] 18  BP: ()/(62-74) 111/70  SpO2:  [98 %-100 %] 98 %    Physical Exam:    General: No acute distress   Lungs: clear to auscultation  Cardiovascular: S1, S2  Abdomen: Soft      -----------------------------------------------------------------------------------------------  PATIENT DISCHARGE INSTRUCTIONS: See electronic chart    Christo Mcclain MD    Total time spent on discharge plannin minutes     The  Cures Act makes medical notes like these available to patients in the interest of  transparency. Please be advised this is a medical document. Medical documents are intended to carry relevant information, facts as evident, and the clinical opinion of the practitioner. The medical note is intended as peer to peer communication and may appear blunt or direct. It is written in medical language and may contain abbreviations or verbiage that are unfamiliar.     Electronically signed by Christo Mcclain MD on 2/15/2024  3:26 PM              Physical Therapy Notes (last 72 hours)      Physical Therapy Note signed by Pushpa Hansen PT at 2/15/2024  3:54 PM  Version 1 of 1    Author: Pushpa Hansen PT Service: Rehab Author Type: Physical Therapist    Filed: 2/15/2024  3:54 PM Date of Service: 2/15/2024 10:15 AM Status: Signed    : Pushpa Hansen PT (Physical Therapist)               PHYSICAL THERAPY EVALUATION - INPATIENT     Room Number: 3610/3610-A  Evaluation Date: 2/15/2024  Type of Evaluation: Initial  Physician Order: PT Eval and Treat    Presenting Problem: abnormal labs, hypernatremia  Co-Morbidities : Dementia, HTN, OA  Reason for Therapy: Mobility Dysfunction and Discharge Planning    PHYSICAL THERAPY ASSESSMENT   Patient is currently functioning below baseline with bed mobility and transfers.  Prior to admission, patient's baseline is indep with supine to sit and sitting EOB, 2 person pivot transfer for bed<>wheelchair, able to propel wc independently.  Patient is requiring dependent as a result of the following impairments: decreased functional strength, decreased endurance/aerobic capacity, pain, impaired sitting balance, decreased muscular endurance, cognitive deficits (dementia), and medical status.  Physical Therapy will continue to follow for duration of hospitalization.    Patient will benefit from continued skilled PT Services to promote return to prior level of function and safety with continuous assistance and gradual rehabilitative therapy . (Trial)    PLAN  PT  Treatment Plan: Bed mobility;Body mechanics;Endurance;Energy conservation;Patient education;Family education;Strengthening;Balance training;Transfer training;Neuromuscular re-educate;Range of motion  Rehab Potential : Guarded  Frequency (Obs):  (2-3x/week)  Number of Visits to Meet Established Goals: Trial      CURRENT GOALS    Goal #1 Patient is able to demonstrate supine - sit EOB @ level: minimum assistance     Goal #2 Patient is able to demonstrate transfers EOB to/from Chair/Wheelchair at assistance level: maximum assistance     Goal #3 Patient is able to maintain static sitting balance for 5  minutes with CGA     Goal #4    Goal #5    Goal #6    Goal Comments: Goals established on 2/15/2024      PHYSICAL THERAPY MEDICAL/SOCIAL HISTORY  History related to current admission: Patient is a 78 year old male admitted on 2024 from [facility] for abnormal labs.  Pt diagnosed with hypernatremia.      HOME SITUATION  Type of Home: Skilled nursing facility ([facility])   Home Layout: One level                Lives With: Staff 24 hours  Drives: No          Prior Level of [place]: Pt poor historian, per [facility], pt able to perform supine to sit independently and sit EOB independently. Requires 2 person assist for lateral transfers and assist with adls.     SUBJECTIVE  Pt primarily non verbal during session.       OBJECTIVE  Precautions: Bed/chair alarm  Fall Risk: High fall risk    WEIGHT BEARING RESTRICTION  Weight Bearing Restriction: None                PAIN ASSESSMENT  Ratin          COGNITION  Overall Cognitive Status:  Impaired    RANGE OF MOTION AND STRENGTH ASSESSMENT  See OT note for UE assessment    Lower extremity ROM is as follows:  Right Hip flexion 90-45  Left Hip flexion 90-45  Right Knee 90-45  Left Knee 90-40    Lower extremity strength not tested     BALANCE  Static Sitting: Poor  Dynamic Sitting: Poor  Static Standing: Not tested  Dynamic Standing: Not tested    ADDITIONAL  TESTS                                    ACTIVITY TOLERANCE                         O2 WALK       NEUROLOGICAL FINDINGS                        AM-PAC '6-Clicks' INPATIENT SHORT FORM - BASIC MOBILITY  How much difficulty does the patient currently have...  Patient Difficulty: Turning over in bed (including adjusting bedclothes, sheets and blankets)?: Unable   Patient Difficulty: Sitting down on and standing up from a chair with arms (e.g., wheelchair, bedside commode, etc.): Unable   Patient Difficulty: Moving from lying on back to sitting on the side of the bed?: Unable   How much help from another person does the patient currently need...   Help from Another: Moving to and from a bed to a chair (including a wheelchair)?: Total   Help from Another: Need to walk in hospital room?: Total   Help from Another: Climbing 3-5 steps with a railing?: Total       AM-PAC Score:  Raw Score: 6   Approx Degree of Impairment: 100%   Standardized Score (AM-PAC Scale): 23.55   CMS Modifier (G-Code): CN    FUNCTIONAL ABILITY STATUS  Gait Assessment   Functional Mobility/Gait Assessment  Gait Assistance: Not tested (non ambulatory at baseline)    Skilled Therapy Provided     Bed Mobility:  Rolling: dep  Supine to sit: dep   Sit to supine: dep     Transfer Mobility:  Sit to stand: NT   Stand to sit: NT  Gait = NT    Therapist's comments: RN cleared for session. Pt agreeable for therapy, received supine. Pt transferred from supine to sit with maxA x2, cued pt on reaching towards bed rails to facilitate rolling and supine to sit. Pt with increased stiffness and contractures in BLEs - hips and knees with limited extension. Pt with increased retropulsion, cued on leaning anteriorly. Pt not following commands during session, follows noises 75% of the time (snapping fingers near ears).    Exercise/Education Provided:  Bed mobility  Energy conservation  Functional activity tolerated  Posture  Strengthening    Patient End of Session: In  bed;Call light within reach;Needs met;RN aware of session/findings;All patient questions and concerns addressed;Alarm set;Discussed recommendations with /      Patient Evaluation Complexity Level:  History High - 3 or more personal factors and/or co-morbidities   Examination of body systems Moderate - addressing a total of 3 or more elements   Clinical Presentation Moderate - Evolving   Clinical Decision Making Moderate - Evolving       PT Session Time: 20 minutes  Gait Trainin minutes  Therapeutic Activity: 11 minutes                    Occupational Therapy Notes (last 72 hours)      Occupational Therapy Note signed by Nadira Lopez OT at 2/15/2024  4:42 PM  Version 1 of 1    Author: Nadira Lopez OT Service: Rehab Author Type: Occupational Therapist    Filed: 2/15/2024  4:42 PM Date of Service: 2/15/2024 10:20 AM Status: Signed    : Nadira Lopez OT (Occupational Therapist)       OCCUPATIONAL THERAPY EVALUATION - INPATIENT     Room Number: 3610/3610-A  Evaluation Date: 2/15/2024  Type of Evaluation: Initial  Presenting Problem: abnormal labs    Physician Order: IP Consult to Occupational Therapy  Reason for Therapy: ADL/IADL Dysfunction and Discharge Planning    OCCUPATIONAL THERAPY ASSESSMENT   Patient is currently functioning below baseline with eating, bed mobility, transfers, stating sitting balance, and dynamic sitting balance. Prior to admission, patient's baseline is A for all ADLs except self feeding and Mod I for bed mobility and sitting at EOB and Max A x2 to pivot to W/C.  Patient is requiring maximum assist as a result of the following impairments: decreased functional strength, decreased functional reach, decreased endurance, and decreased ability to follow commands . Occupational Therapy will continue to follow for duration of hospitalization.    Patient will benefit from continued skilled OT Services at discharge to promote functional  independence and safety with additional support and return home with home health OT at [facility]      History Related to Current Admission: Patient is a 78 year old male admitted on 2/13/2024 with Presenting Problem: abnormal labs. Co-Morbidities : Dementia, HTN, OA    WEIGHT BEARING RESTRICTION  Weight Bearing Restriction: None                Recommendations for nursing staff:   Transfers: total lift  Toileting location: bed level    EVALUATION SESSION:  Patient Start of Session: supine in bed for session  FUNCTIONAL TRANSFER ASSESSMENT  Sit to Stand: Edge of Bed  Edge of Bed: Not Tested (2/2 pt not following commands to sit at EOB)    BED MOBILITY  Rolling: Maximum Assist  Supine to Sit : Maximum Assist  Sit to Supine (OT): Maximum Assist    BALANCE ASSESSMENT  Static Sitting: Moderate Assist    FUNCTIONAL ADL ASSESSMENT  LB Dressing Seated: Maximum Assist (to katelyn socks unable to progress further 2/2 not following commands)      ACTIVITY TOLERANCE: vitals stable                         O2 SATURATIONS       COGNITION  Overall Cognitive Status:  Impaired and unable to follow commands    Upper Extremity   ROM: within functional limits   Strength: within functional limits   Coordination  Gross motor: WNl  Fine motor: WNL  Sensation: Light touch:  intact    EDUCATION PROVIDED  Patient: Role of Occupational Therapy; Plan of Care  Patient's Response to Education: Demonstrates Poor Carry Over to Information; Requires Further Education    Equipment used: TBD  Demonstrates functional use, Would benefit from additional trial          Patient End of Session: Needs met;In bed;All patient questions and concerns addressed;SCDs in place;Alarm set    OCCUPATIONAL PROFILE    HOME SITUATION  Type of Home: Skilled nursing facility ([facility])  Home Layout: One level  Lives With: Staff 24 hours                     Drives: No       Prior Level of Function: Prior to admission, patient's baseline is A for all ADLs except self feeding and Mod I  for bed mobility and sitting at EOB and Max A x2 to pivot to W/C.     SUBJECTIVE   Pt stated, \"I am doing well.\"    PAIN ASSESSMENT  Rating: Unable to rate  Location: unknown       OBJECTIVE  Precautions: Bed/chair alarm  Fall Risk: High fall risk      ASSESSMENTS    AM-PAC ‘6-Clicks’ Inpatient Daily Activity Short Form  -   Putting on and taking off regular lower body clothing?: A Lot  -   Bathing (including washing, rinsing, drying)?: A Lot  -   Toileting, which includes using toilet, bedpan or urinal? : A Lot  -   Putting on and taking off regular upper body clothing?: A Lot  -   Taking care of personal grooming such as brushing teeth?: A Lot  -   Eating meals?: A Lot    AM-PAC Score:  Score: 12  Approx Degree of Impairment: 66.57%  Standardized Score (AM-PAC Scale): 30.6    ADDITIONAL TESTS     NEUROLOGICAL FINDINGS      COGNITION ASSESSMENTS       PLAN  OT Treatment Plan: Balance activities;Energy conservation/work simplification techniques;ADL training;IADL training;Continued evaluation;Compensatory technique education;Equipment eval/education;Patient/Family training;Patient/Family education;UE strengthening/ROM;Endurance training;Functional transfer training  Rehab Potential : Guarded  Frequency: 3x/week  Number of Visits to Meet Established Goals: 3;Trial    ADL Goals   Patient will perform eating: with set up    Functional Transfer Goals  Patient will transfer in bed by rolling:  with min assist  Patient will transfer from sit to supine:  with min assist  Patient will transfer from supine to sit:  with min assist    Patient Evaluation Complexity Level:   Occupational Profile/Medical History MODERATE - Expanded review of history including review of medical or therapy record   Specific performance deficits impacting engagement in ADL/IADL MODERATE  3 - 5 performance deficits   Client Assessment/Performance Deficits MODERATE - Comorbidities and min to mod modifications of tasks    Clinical Decision Making  MODERATE - Analysis of occupational profile, detailed assessments, several treatment options    Overall Complexity MODERATE     OT Session Time: 20 minutes  Self-Care Home Management: 0 minutes  Therapeutic Activity: 10 minutes  Neuromuscular Re-education: 0 minutes  Therapeutic Exercise: 0 minutes  Cognitive Skills: 0 minutes  Sensory Integrative: 0 minutes  Orthotic Management and Trainin minutes  Can add/delete any of these                                                         Video Swallow Study Notes    No notes of this type exist for this encounter.        SLP Note - SLP Notes      SLP Note signed by Raul Rodriguez SLP at 2/15/2024 12:05 PM  Version 1 of 1    Author: Raul Rodriguez SLP Service: Rehab Author Type: Speech and Language Pathologist    Filed: 2/15/2024 12:05 PM Date of Service: 2/15/2024 11:00 AM Status: Signed    : Raul Rodriguez SLP (Speech and Language Pathologist)       SPEECH DAILY NOTE - INPATIENT    ASSESSMENT & PLAN   ASSESSMENT  Pt seen for dysphagia tx to assess tolerance with recommended diet, ensure proper utilization of aspiration precautions and provide pt/family education.  Patient drowsy, and minimally arousable, in bed. He was initially agreeable to PO trials to assess diet tolerance and ready for advancement. However, he quickly began to refuse further PO intake. No overt s/s of aspiration observed with mildly thick liquids accepted. He refused all solids PO intake. Transfer paperwork from LTC facility received and indicated a regular diet with mildly thick liquids at baseline. Recommend patient continue a pureed diet and mildly thick liquids. SLP will continue to follow per POC.    Diet Recommendations - Solids: Puree  Diet Recommendations - Liquids: Nectar thick liquids/ Mildly thick    Compensatory Strategies Recommended: Small bites and sips  Aspiration Precautions: Upright position  Medication Administration Recommendations: One pill at a time    Patient  Experiencing Pain: No                Treatment Plan  Treatment Plan/Recommendations: SLP to reassess;Aspiration precautions    Interdisciplinary Communication: Discussed with RN            GOALS  Goal #1 The patient will tolerate puree consistency and mildly thick liquids without overt signs or symptoms of aspiration with 95 % accuracy over 1-2 session(s).  In Progress   Goal #2 SLP to re-assess as appropriate.     In Progress   Goal #3       Goal #4       Goal #5       Goal #6       Goal #7       Goal #8            FOLLOW UP  Follow Up Needed (Documentation Required): Yes  SLP Follow-up Date: 02/16/24       Session: 1    If you have any questions, please contact MOLLY Parr    Electronically signed by Raul Rodriguez SLP on 2/15/2024 12:05 PM           Immunizations     Name Date      INFLUENZA defer-02/16/24     Deferral: Patient Refused     INFLUENZA 03/25/22     Kenalog Per 10mg Inj 04/29/09     Kenalog Per 10mg Inj 01/28/09     Orthovisc, Intra-Articular 07/27/09     Orthovisc, Intra-Articular 07/15/09     Orthovisc, Intra-Articular 07/08/09     Orthovisc, Intra-Articular 07/01/09       Multidisciplinary Problems     Active Goals     Not on file